# Patient Record
Sex: FEMALE | Race: WHITE | NOT HISPANIC OR LATINO | Employment: UNEMPLOYED | URBAN - METROPOLITAN AREA
[De-identification: names, ages, dates, MRNs, and addresses within clinical notes are randomized per-mention and may not be internally consistent; named-entity substitution may affect disease eponyms.]

---

## 2017-03-27 ENCOUNTER — TELEPHONE (OUTPATIENT)
Dept: MEDICAL GROUP | Facility: PHYSICIAN GROUP | Age: 57
End: 2017-03-27

## 2017-03-27 NOTE — TELEPHONE ENCOUNTER
1. Caller Name: Khanh Sandra                      Call Back Number: [phone]    2. Message: Pt  called and is very frustrated. Pt  found out today that his wife appointment is cancelled as of today for Wednesday. Khanh is worried about rx for his wife for oxycodone and I calmed him done and scheduled her to see Dr. Cardona on Thursday to get medications in the  mean time. Khanh wants his wife to see Dr. Burnette sooner than July which is the next new pt available and I dont have authority to override anything. Pt is mostly upset that no one tried more than one number listed or write a letter when his wifes appointment was scheduled in December. Pt wanted to speak with supervisor Chio Berry.  Call his work number [phone]    3. Patient approves office to leave a detailed voicemail/MyChart message: N\A

## 2017-03-28 NOTE — TELEPHONE ENCOUNTER
I called Khanh.  He was very upset that Lisa's appointment was cancelled without them knowing.  I kindly read the notes in appointment notes for the 3/30 visit.  It lists several attempts to contact Lisa starting as far back as January.   I explained several e-mails were sent as well as multiple attempts to contact Lisa.  Khanh stated they don't have an e-mail, and why wasn't the second number called?   Khanh does all of her scheduling for her.  I have updated her phone number to be his work number and also added a permanent comment to contact Khanh regarding scheduling and scheduling changes.     In regards to Lisa's visit with Dr. Cardona.  She scheduled an appointment with Dr. Burnette back in October 2016.  She was a patient of Dr. Avila.  She is coming in to see Dr. Cardona, as she is currently out of her pain medication.  She can not wait to see Dr. Burnette, as that would be months without her pain medication.   She wants to discuss with Dr. Guillen the dosage of her pain medication. It was lowered, and since it being lowered she has been in much pain.   Dr. Avila had done a referral to pain doctor, but pain doctor did nothing for Lisa, and she has never gone back.      I let Khanh know that if she is in a lot of pain, then she would need to go to UC or ER.  He said that she won't go, and will just deal with the pain.  She is almost to the point where she can not walk.

## 2017-03-30 ENCOUNTER — APPOINTMENT (OUTPATIENT)
Dept: MEDICAL GROUP | Facility: PHYSICIAN GROUP | Age: 57
End: 2017-03-30
Payer: COMMERCIAL

## 2017-03-30 ENCOUNTER — OFFICE VISIT (OUTPATIENT)
Dept: MEDICAL GROUP | Facility: PHYSICIAN GROUP | Age: 57
End: 2017-03-30
Payer: COMMERCIAL

## 2017-03-30 VITALS — HEART RATE: 106 BPM | OXYGEN SATURATION: 96 % | TEMPERATURE: 98.1 F

## 2017-03-30 DIAGNOSIS — I10 ESSENTIAL HYPERTENSION: ICD-10-CM

## 2017-03-30 DIAGNOSIS — I48.0 PAROXYSMAL ATRIAL FIBRILLATION (HCC): ICD-10-CM

## 2017-03-30 DIAGNOSIS — G89.4 CHRONIC PAIN SYNDROME: ICD-10-CM

## 2017-03-30 DIAGNOSIS — F11.90 CHRONIC, CONTINUOUS USE OF OPIOIDS: ICD-10-CM

## 2017-03-30 DIAGNOSIS — E66.01 MORBID OBESITY, UNSPECIFIED OBESITY TYPE (HCC): ICD-10-CM

## 2017-03-30 DIAGNOSIS — R80.9 MICROALBUMINURIA: ICD-10-CM

## 2017-03-30 PROCEDURE — 99214 OFFICE O/P EST MOD 30 MIN: CPT | Performed by: INTERNAL MEDICINE

## 2017-03-30 RX ORDER — ATENOLOL 25 MG/1
100 TABLET ORAL DAILY
COMMUNITY

## 2017-03-30 RX ORDER — OXYCODONE HCL 20 MG/1
20 TABLET, FILM COATED, EXTENDED RELEASE ORAL EVERY 12 HOURS
Qty: 60 EACH | Refills: 0 | Status: SHIPPED | OUTPATIENT
Start: 2017-03-30 | End: 2017-03-31

## 2017-03-30 NOTE — MR AVS SNAPSHOT
Lisa Sandra   3/30/2017 8:00 AM   Office Visit   MRN: [id number]    Department:  [facility]   Dept Phone:  [phone]    Description:  Female : 1960   Provider:  Lynda Cardona M.D.           Reason for Visit     Medication Refill pain med management      Allergies as of 3/30/2017     Allergen Noted Reactions    Nkda [No Known Drug Allergy] 10/04/2012         You were diagnosed with     Chronic pain syndrome   [338.4.ICD-9-CM]       Chronic, continuous use of opioids   [824133]       Paroxysmal atrial fibrillation (CMS-HCC)   [072873]       Essential hypertension   [9356069]       Microalbuminuria   [304015]         Vital Signs     Pulse Temperature Oxygen Saturation Smoking Status          106 36.7 °C (98.1 °F) 96% Never Smoker         Basic Information     Date Of Birth Sex Race Ethnicity Preferred Language    1960 Female White Non- English      Your appointments     May 16, 2017  3:40 PM   NEW TO YOU with Jaye Burnette M.D.   [facility] (--)    [address]  [address]  [facility] [address]   [phone]              Problem List              ICD-10-CM Priority Class Noted - Resolved    Chronic pain syndrome G89.4   2010 - Present    Morbid obesity (CMS-HCC) E66.01   2010 - Present    Chronic tension headaches G44.229   2010 - Present    IBS (irritable bowel syndrome) K58.9   2010 - Present    Hypothyroidism E03.9   2010 - Present    HTN (hypertension) I10   2010 - Present    Fibromyalgia M79.7   2010 - Present    Microalbuminuria R80.9   2013 - Present    Chronic allergic rhinitis J30.9   2013 - Present    Vitamin D deficiency E55.9   2015 - Present    Chronic, continuous use of opioids F11.90   2016 - Present    Paroxysmal atrial fibrillation (CMS-HCC) I48.0   2016 - Present      Health Maintenance        Date Due Completion Dates    IMM DTaP/Tdap/Td Vaccine (1 - Tdap) 11/10/1977 ---    COLONOSCOPY 11/10/2008 ---    MAMMOGRAM 10/14/2010 10/14/2009, 10/14/2009    IMM INFLUENZA (1) 9/1/2016 ---    PAP SMEAR 10/18/2019 10/18/2016, 7/20/2016, 2/23/2016            Current Immunizations     No immunizations on file.      Below and/or attached are the medications your provider expects you to take. Review all of your home medications and newly ordered medications with your provider and/or pharmacist. Follow medication instructions as directed by your provider and/or pharmacist. Please keep your medication list with you and share with your provider. Update the information when medications are discontinued, doses are changed, or new medications (including over-the-counter products) are added; and carry medication information at all times in the event of emergency situations     Allergies:  NKDA - (reactions not documented)               Medications  Valid as of: March 30, 2017 -  8:53 AM    Generic Name Brand Name Tablet Size Instructions for use    Ascorbic Acid (Tab) ascorbic acid 500 MG Take 500 mg by mouth every day.        Atenolol (Tab) TENORMIN 25 MG Take 25 mg by mouth every day.        B Complex-C   Take  by mouth every day. Pt takes 2 tabs once daily        Calcium Carbonate Antacid (Chew Tab) TUMS 500 MG Take 2 Tabs by mouth every day.        Cholecalciferol (Tab) Vitamin D-3 5000 UNITS Take  by mouth every day.        Coenzyme Q10 (Cap) coenzyme Q-10 30 MG Take 60 mg by mouth every day.          Dabigatran Etexilate Mesylate (Cap) PRADAXA 150 MG Take 150 mg by mouth 2 Times a Day.        Iodine (Kelp) (Tab) Kelp  Take  by mouth 1 time daily as needed.        Levothyroxine Sodium (Tab) SYNTHROID 75 MCG TAKE ONE TABLET BY MOUTH ONE TIME DAILY        Lisinopril (Tab) PRINIVIL 2.5 MG TAKE ONE TABLET BY MOUTH ONE TIME DAILY        Omega-3 Fatty Acids (Cap) Omega-3 Fatty Acids 1200 MG Take 1,200 mg by mouth 2 Times a Day.        OxyCODONE HCl (Tablet Extended Release 12 hour Abuse-Deterrent) OXYCONTIN  40 MG Take one tab in AM. Take 2 tabs in PM        OxyCODONE HCl (Tablet Extended Release 12 hour Abuse-Deterrent) OXYCONTIN 40 MG Take 1 Tab by mouth every 12 hours.        OxyCODONE HCl (Tablet Extended Release 12 hour Abuse-Deterrent) OXYCONTIN 20 MG Take 1 Tab by mouth every 12 hours.        Psyllium   Take  by mouth every day.        Resveratrol (Cap) Resveratrol 250 MG Take 2 Caps by mouth every day.        Triprolidine-Pseudoephedrine (Tab) APRODINE 2.5-60 MG Take 1 Tab by mouth every 6 hours as needed.        .                 Medicines prescribed today were sent to:     [facility] - [place], [place] - [address]    [address] [place] [address]    Phone: [phone] Fax: [phone]    Open 24 Hours?: No      Medication refill instructions:       If your prescription bottle indicates you have medication refills left, it is not necessary to call your provider’s office. Please contact your pharmacy and they will refill your medication.    If your prescription bottle indicates you do not have any refills left, you may request refills at any time through one of the following ways: The online Zaldiva system (except Urgent Care), by calling your provider’s office, or by asking your pharmacy to contact your provider’s office with a refill request. Medication refills are processed only during regular business hours and may not be available until the next business day. Your provider may request additional information or to have a follow-up visit with you prior to refilling your medication.   *Please Note: Medication refills are assigned a new Rx number when refilled electronically. Your pharmacy may indicate that no refills were authorized even though a new prescription for the same medication is available at the pharmacy. Please request the medicine by name with the pharmacy before contacting your provider for a refill.           Zaldiva Access Code: [email or web address]  Expires: 4/29/2017  8:53  AM    [employer]  A secure, online tool to manage your health information     [employer]’s Vedicis® is a secure, online tool that connects you to your personalized health information from the privacy of your home -- day or night - making it very easy for you to manage your healthcare. Once the activation process is completed, you can even access your medical information using the Vedicis salomón, which is available for free in the Apple Salomón store or Google Play store.     Vedicis provides the following levels of access (as shown below):   My Chart Features   [facility] Primary [facility] Doctor [facility]  Specialists [facility]  Urgent  Care Non-[facility]  Primary Care  Doctor   Email your healthcare team securely and privately 24/7 X X X    Manage appointments: schedule your next appointment; view details of past/upcoming appointments X      Request prescription refills. X      View recent personal medical records, including lab and immunizations X X X X   View health record, including health history, allergies, medications X X X X   Read reports about your outpatient visits, procedures, consult and ER notes X X X X   See your discharge summary, which is a recap of your hospital and/or ER visit that includes your diagnosis, lab results, and care plan. X X       How to register for Vedicis:  1. Go to  [email or web address].  2. Click on the Sign Up Now box, which takes you to the New Member Sign Up page. You will need to provide the following information:  a. Enter your Vedicis Access Code exactly as it appears at the top of this page. (You will not need to use this code after you’ve completed the sign-up process. If you do not sign up before the expiration date, you must request a new code.)   b. Enter your date of birth.   c. Enter your home email address.   d. Click Submit, and follow the next screen’s instructions.  3. Create a Vedicis ID. This will be your Vedicis login ID and cannot be changed, so think of one that is secure and  easy to remember.  4. Create a [employer] password. You can change your password at any time.  5. Enter your Password Reset Question and Answer. This can be used at a later time if you forget your password.   6. Enter your e-mail address. This allows you to receive e-mail notifications when new information is available in [employer].  7. Click Sign Up. You can now view your health information.    For assistance activating your [employer] account, call [phone]

## 2017-03-31 ENCOUNTER — TELEPHONE (OUTPATIENT)
Dept: MEDICAL GROUP | Facility: CLINIC | Age: 57
End: 2017-03-31

## 2017-03-31 DIAGNOSIS — G89.4 CHRONIC PAIN SYNDROME: ICD-10-CM

## 2017-03-31 RX ORDER — OXYCODONE HCL 40 MG/1
TABLET, FILM COATED, EXTENDED RELEASE ORAL
Qty: 60 TAB | Refills: 0 | Status: SHIPPED | OUTPATIENT
Start: 2017-03-31 | End: 2017-03-31 | Stop reason: SDUPTHER

## 2017-03-31 RX ORDER — OXYCODONE HCL 40 MG/1
TABLET, FILM COATED, EXTENDED RELEASE ORAL
Qty: 60 TAB | Refills: 0 | Status: SHIPPED | OUTPATIENT
Start: 2017-03-31

## 2017-03-31 NOTE — ASSESSMENT & PLAN NOTE
Pt is here today for pain management.. She is morbid obese. She tells me that she has pain from head to toe. Her back hurts, her feet hurts. She used to be on oxycontin 80 mg BID. Was able to cut to 40 mg BID and last one to 20 mg BID. She has seen a pain specialist and was offered injections, but she is not interested on injections . She has no imagines on file.   She is not willing to go to 20 mg BID because it is not working. One month supply given. I explained to pt that I have limitation to only 60 mg per month. Her symptoms are mostly from fibromyalgia and chronic lymphedema.    Overall impression that this patient is benefiting from opioid therapy and that the benefits outweigh the risks of continued use: yes     - Controlled Substance Use Agreement with pain specialist    - I did not order Urine drug screen today. Positive in the past. Pt compliant    - Additional lab: CMP to assess liver and renal function up to date   - Rx refill prescriptions only for one month   - Referral to pain management: yes

## 2017-03-31 NOTE — TELEPHONE ENCOUNTER
Khanh Sandra, Lisa's , will come in today to pickup new RX for Oxy 40MG, 2 RX's.  I have notified Khanh about this pick-up.

## 2017-03-31 NOTE — PROGRESS NOTES
Subjective:   Lisa Sandra is a 56 y.o. female here today for pain meds     Chronic pain syndrome  Pt is here today for pain management.. She is morbid obese. She tells me that she has pain from head to toe. Her back hurts, her feet hurts. She used to be on oxycontin 80 mg BID. Was able to cut to 40 mg BID and last one to 20 mg BID. She has seen a pain specialist and was offered injections, but she is not interested on injections . She has no imagines on file.   She is not willing to go to 20 mg BID because it is not working. One month supply given. I explained to pt that I have limitation to only 60 mg per month. Her symptoms are mostly from fibromyalgia and chronic lymphedema.    Overall impression that this patient is benefiting from opioid therapy and that the benefits outweigh the risks of continued use: yes     - Controlled Substance Use Agreement with pain specialist    - I did not order Urine drug screen today. Positive in the past. Pt compliant    - Additional lab: CMP to assess liver and renal function up to date   - Rx refill prescriptions only for one month   - Referral to pain management: yes      Paroxysmal atrial fibrillation  Rate controled. Asymptomatic. On atenolol and pradaxa. No echo on file. Follow up with cardiologist    Morbid Obesity  She refuses to weight. Her HR goes extremely high is she weight. She tells me that she starve herself. She eats only strawberry and small portion. She cannot loose weight. She tells that she was on diet since she was 5 y/o. She is not interested on gastric by pass because she does not eat much. She states that she is very malnourished because she eats little, that's why she is on vitamin supplement     Microalbuminuria  She is on lisinopril 2.5 mg daily. No diabetes, likely from obesity     HTN (Hypertension)  Well controled. On minimal dose of lisinopril and atenolol          Current medicines (including changes today)  Current Outpatient Prescriptions    Medication Sig Dispense Refill   • atenolol (TENORMIN) 25 MG Tab Take 25 mg by mouth every day.     • oxyCODONE CR (OXYCONTIN) 20 MG Tablet Extended Release 12 hour Abuse-Deterrent Take 1 Tab by mouth every 12 hours. 60 Each 0   • oxyCODONE CR (OXYCONTIN) 40 MG Tablet Extended Release 12 hour Abuse-Deterrent tablet Take 1 Tab by mouth every 12 hours. 60 Each 0   • levothyroxine (SYNTHROID) 75 MCG Tab TAKE ONE TABLET BY MOUTH ONE TIME DAILY 90 Tab 3   • triprolidine-PSE (APRODINE) 2.5-60 MG Tab Take 1 Tab by mouth every 6 hours as needed. 360 Tab 1   • lisinopril (PRINIVIL) 2.5 MG Tab TAKE ONE TABLET BY MOUTH ONE TIME DAILY 90 Tab 2   • dabigatran (PRADAXA) 150 MG Cap capsule Take 150 mg by mouth 2 Times a Day.     • Cholecalciferol (VITAMIN D-3) 5000 UNITS Tab Take  by mouth every day.     • ascorbic acid (ASCORBIC ACID) 500 MG TABS Take 500 mg by mouth every day.     • coenzyme Q-10 30 MG capsule Take 60 mg by mouth every day.       • Kelp TABS Take  by mouth 1 time daily as needed.     • calcium carbonate (TUMS) 500 MG CHEW Take 2 Tabs by mouth every day.     • Resveratrol 250 MG CAPS Take 2 Caps by mouth every day.     • B Complex-C (SUPER B COMPLEX PO) Take  by mouth every day. Pt takes 2 tabs once daily     • Psyllium (METAMUCIL PO) Take  by mouth every day.     • Omega-3 Fatty Acids (OMEGA 3) 1200 MG CAPS Take 1,200 mg by mouth 2 Times a Day.     • oxyCODONE CR (OXYCONTIN) 40 MG Tablet Extended Release 12 hour Abuse-Deterrent tablet Take one tab in AM. Take 2 tabs in PM 90 Tab 0     No current facility-administered medications for this visit.     She  has a past medical history of Morbid obesity (CMS-HCC) (1/19/2010); Chronic tension headaches (1/19/2010); IBS (irritable bowel syndrome) (1/19/2010); Hypothyroidism (1/19/2010); HTN (hypertension) (1/19/2010); Uterine cancer (CMS-HCC) (2009); Fibromyalgia (12/28/2010); Atrial fibrillation (CMS-[facility]); Hypercalcemia; and Allergic rhinitis (9/9/2011).    Current  Outpatient Prescriptions   Medication Sig Dispense Refill   • atenolol (TENORMIN) 25 MG Tab Take 25 mg by mouth every day.     • oxyCODONE CR (OXYCONTIN) 20 MG Tablet Extended Release 12 hour Abuse-Deterrent Take 1 Tab by mouth every 12 hours. 60 Each 0   • oxyCODONE CR (OXYCONTIN) 40 MG Tablet Extended Release 12 hour Abuse-Deterrent tablet Take 1 Tab by mouth every 12 hours. 60 Each 0   • levothyroxine (SYNTHROID) 75 MCG Tab TAKE ONE TABLET BY MOUTH ONE TIME DAILY 90 Tab 3   • triprolidine-PSE (APRODINE) 2.5-60 MG Tab Take 1 Tab by mouth every 6 hours as needed. 360 Tab 1   • lisinopril (PRINIVIL) 2.5 MG Tab TAKE ONE TABLET BY MOUTH ONE TIME DAILY 90 Tab 2   • dabigatran (PRADAXA) 150 MG Cap capsule Take 150 mg by mouth 2 Times a Day.     • Cholecalciferol (VITAMIN D-3) 5000 UNITS Tab Take  by mouth every day.     • ascorbic acid (ASCORBIC ACID) 500 MG TABS Take 500 mg by mouth every day.     • coenzyme Q-10 30 MG capsule Take 60 mg by mouth every day.       • Kelp TABS Take  by mouth 1 time daily as needed.     • calcium carbonate (TUMS) 500 MG CHEW Take 2 Tabs by mouth every day.     • Resveratrol 250 MG CAPS Take 2 Caps by mouth every day.     • B Complex-C (SUPER B COMPLEX PO) Take  by mouth every day. Pt takes 2 tabs once daily     • Psyllium (METAMUCIL PO) Take  by mouth every day.     • Omega-3 Fatty Acids (OMEGA 3) 1200 MG CAPS Take 1,200 mg by mouth 2 Times a Day.     • oxyCODONE CR (OXYCONTIN) 40 MG Tablet Extended Release 12 hour Abuse-Deterrent tablet Take one tab in AM. Take 2 tabs in PM 90 Tab 0     No current facility-administered medications for this visit.       Allergies as of 03/30/2017 - [place] as Reviewed 03/30/2017   Allergen Reaction Noted   • Nkda [no known drug allergy]  10/04/2012       Social History     Social History   • Marital Status:      Spouse Name: N/A   • Number of Children: N/A   • Years of Education: N/A     Occupational History   • Not on file.     Social History Main  Topics   • Smoking status: Never Smoker    • Smokeless tobacco: Never Used   • Alcohol Use: No   • Drug Use: No   • Sexual Activity: Not on file     Other Topics Concern   • Not on file     Social History Narrative        Family History   Problem Relation Age of Onset   • Cancer Mother      breast cancer       Past Surgical History   Procedure Laterality Date   • Abdominal hysterectomy total     • Dilation and curettage  8-2009     D & C   • Recovery  9/28/2011     Performed by SURGERY, CATH-RECOVERY at [facility]   • Parathyroidectomy  November 2011       ROS   No chest pain, no shortness of breath, no abdominal pain. All other system negative expect for HPI        Objective:     Pulse 106, temperature 36.7 °C (98.1 °F), SpO2 96 %. There is no weight on file to calculate BMI.   Physical Exam:  Constitutional: Alert, no distress, very obese   Skin: Warm, dry, good turgor, no rashes in visible areas.  Eye: Equal, round and reactive, conjunctiva clear, lids normal.  ENMT: Lips without lesions, good dentition, oropharynx clear.  Neck: Trachea midline, no masses, no thyromegaly. No cervical or supraclavicular lymphadenopathy  Respiratory: Unlabored respiratory effort, lungs clear to auscultation, no wheezes, no ronchi.  Cardiovascular: irregular S1, S2, no murmur,  + chronic lymphedema   Abdomen: Soft, non-tender, no masses, no hepatosplenomegaly.  Psych: Alert and oriented x3, normal affect and mood.        Assessment and Plan:   The following treatment plan was discussed    1. Chronic pain syndrome  - REFERRAL TO PAIN MANAGEMENT  - oxyCODONE CR (OXYCONTIN) 20 MG Tablet Extended Release 12 hour Abuse-Deterrent; Take 1 Tab by mouth every 12 hours.  Dispense: 60 Each; Refill: 0    2. Chronic, continuous use of opioids  - REFERRAL TO PAIN MANAGEMENT    3. Paroxysmal atrial fibrillation (CMS-HCC)  Rate controled. Asymptomatic. S/p cardioversion twice     4. Essential hypertension  Well controled.  Continue to monitor     5. Microalbuminuria  Continue to monitor     6. Morbid obesity, unspecified obesity type (CMS-HCC)  Pt needs close monitoring by specialized medical health. TSH wnl.       Followup: Return if symptoms worsen or fail to improve, for Long.

## 2017-03-31 NOTE — ASSESSMENT & PLAN NOTE
She refuses to weight. Her HR goes extremely high is she weight. She tells me that she starve herself. She eats only strawberry and small portion. She cannot loose weight. She tells that she was on diet since she was 7 y/o. She is not interested on gastric by pass because she does not eat much. She states that she is very malnourished because she eats little, that's why she is on vitamin supplement

## 2017-03-31 NOTE — ASSESSMENT & PLAN NOTE
Rate controled. Asymptomatic. On atenolol and pradaxa. No echo on file. Follow up with cardiologist

## 2017-04-18 ENCOUNTER — HOSPITAL ENCOUNTER (OUTPATIENT)
Facility: MEDICAL CENTER | Age: 57
End: 2017-04-18
Attending: SPECIALIST
Payer: COMMERCIAL

## 2017-04-18 PROCEDURE — 88175 CYTOPATH C/V AUTO FLUID REDO: CPT

## 2017-04-19 LAB — CYTOLOGY REG CYTOL: NORMAL

## 2017-04-24 RX ORDER — PSEUDOEPHEDRINE HCL AND TRIPOLIDINE 60; 2.5 MG/1; MG/1
TABLET, FILM COATED ORAL
Qty: 360 TAB | Refills: 0 | Status: SHIPPED | OUTPATIENT
Start: 2017-04-24 | End: 2017-08-16 | Stop reason: SDUPTHER

## 2017-05-16 ENCOUNTER — OFFICE VISIT (OUTPATIENT)
Dept: MEDICAL GROUP | Facility: PHYSICIAN GROUP | Age: 57
End: 2017-05-16
Payer: COMMERCIAL

## 2017-05-16 VITALS
OXYGEN SATURATION: 98 % | DIASTOLIC BLOOD PRESSURE: 86 MMHG | HEART RATE: 95 BPM | WEIGHT: 293 LBS | HEIGHT: 65 IN | SYSTOLIC BLOOD PRESSURE: 146 MMHG | BODY MASS INDEX: 48.82 KG/M2 | TEMPERATURE: 97.7 F

## 2017-05-16 DIAGNOSIS — E03.9 HYPOTHYROIDISM, UNSPECIFIED TYPE: ICD-10-CM

## 2017-05-16 DIAGNOSIS — I10 ESSENTIAL HYPERTENSION: ICD-10-CM

## 2017-05-16 DIAGNOSIS — M79.7 FIBROMYALGIA: ICD-10-CM

## 2017-05-16 DIAGNOSIS — Z79.891 CHRONIC USE OF OPIATE DRUGS THERAPEUTIC PURPOSES: ICD-10-CM

## 2017-05-16 DIAGNOSIS — I48.0 PAROXYSMAL ATRIAL FIBRILLATION (HCC): ICD-10-CM

## 2017-05-16 DIAGNOSIS — G89.4 CHRONIC PAIN SYNDROME: ICD-10-CM

## 2017-05-16 DIAGNOSIS — F11.20 OPIATE DEPENDENCE, CONTINUOUS (HCC): ICD-10-CM

## 2017-05-16 PROCEDURE — 99214 OFFICE O/P EST MOD 30 MIN: CPT | Performed by: FAMILY MEDICINE

## 2017-05-16 NOTE — Clinical Note
May 18, 2017        Lisa Sandra  Current Outpatient Prescriptions   Medication Sig Dispense Refill   • APRODINE 2.5-60 MG Tab TAKE ONE TABLET BY MOUTH EVERY SIX HOURS AS NEEDED 360 Tab 0   • oxyCODONE CR (OXYCONTIN) 40 MG Tablet Extended Release 12 hour Abuse-Deterrent tablet Take one tab in AM. Take 2 tabs in PM 60 Tab 0   • atenolol (TENORMIN) 25 MG Tab Take 25 mg by mouth every day.     • oxyCODONE CR (OXYCONTIN) 40 MG Tablet Extended Release 12 hour Abuse-Deterrent tablet Take 1 Tab by mouth every 12 hours. 60 Each 0   • levothyroxine (SYNTHROID) 75 MCG Tab TAKE ONE TABLET BY MOUTH ONE TIME DAILY 90 Tab 3   • lisinopril (PRINIVIL) 2.5 MG Tab TAKE ONE TABLET BY MOUTH ONE TIME DAILY 90 Tab 2   • dabigatran (PRADAXA) 150 MG Cap capsule Take 150 mg by mouth 2 Times a Day.     • Cholecalciferol (VITAMIN D-3) 5000 UNITS Tab Take  by mouth every day.     • ascorbic acid (ASCORBIC ACID) 500 MG TABS Take 500 mg by mouth every day.     • coenzyme Q-10 30 MG capsule Take 60 mg by mouth every day.       • Kelp TABS Take  by mouth 1 time daily as needed.     • calcium carbonate (TUMS) 500 MG CHEW Take 2 Tabs by mouth every day.     • Resveratrol 250 MG CAPS Take 2 Caps by mouth every day.     • B Complex-C (SUPER B COMPLEX PO) Take  by mouth every day. Pt takes 2 tabs once daily     • Psyllium (METAMUCIL PO) Take  by mouth every day.     • Omega-3 Fatty Acids (OMEGA 3) 1200 MG CAPS Take 1,200 mg by mouth 2 Times a Day.       No current facility-administered medications for this visit.

## 2017-05-16 NOTE — MR AVS SNAPSHOT
"        Lisa Sandra   2017 3:40 PM   Office Visit   MRN: [id number]    Department:  [facility]   Dept Phone:  [phone]    Description:  Female : 1960   Provider:  Jaye Burnette M.D.           Reason for Visit     Establish Care new to you       Allergies as of 2017     Allergen Noted Reactions    Nkda [No Known Drug Allergy] 10/04/2012         You were diagnosed with     Chronic pain syndrome   [338.4.ICD-9-CM]       Chronic use of opiate drugs therapeutic purposes   [8626507]       Opiate dependence, continuous (CMS-[facility])   [684299]       Fibromyalgia   [952460]       Hypothyroidism, unspecified type   [3725718]       Essential hypertension   [2844683]       Paroxysmal atrial fibrillation (CMS-[facility])   [029151]         Vital Signs     Blood Pressure Pulse Temperature Height Weight Body Mass Index    146/86 mmHg 95 36.5 °C (97.7 °F) 1.651 m (5' 5\") 153.316 kg (338 lb) 56.25 kg/m2    Oxygen Saturation Smoking Status                98% Never Smoker           Basic Information     Date Of Birth Sex Race Ethnicity Preferred Language    1960 Female White Non- English      Problem List              ICD-10-CM Priority Class Noted - Resolved    Chronic pain syndrome G89.4   2010 - Present    Morbid obesity (CMS-HCC) E66.01   2010 - Present    Chronic tension headaches G44.229   2010 - Present    IBS (irritable bowel syndrome) K58.9   2010 - Present    Hypothyroidism E03.9   2010 - Present    HTN (hypertension) I10   2010 - Present    Fibromyalgia M79.7   2010 - Present    Microalbuminuria R80.9   2013 - Present    Chronic allergic rhinitis J30.9   2013 - Present    Vitamin D deficiency E55.9   2015 - Present    Chronic, continuous use of opioids F11.90   2016 - Present    Paroxysmal atrial fibrillation (CMS-[facility]) I48.0   2016 - Present      Health Maintenance        Date Due Completion Dates    IMM DTaP/Tdap/Td Vaccine (1 " - Tdap) 11/10/1977 ---    COLONOSCOPY 11/10/2008 ---    MAMMOGRAM 10/14/2010 10/14/2009, 10/14/2009    PAP SMEAR 4/18/2020 4/18/2017, 10/18/2016, 7/20/2016, 2/23/2016            Current Immunizations     No immunizations on file.      Below and/or attached are the medications your provider expects you to take. Review all of your home medications and newly ordered medications with your provider and/or pharmacist. Follow medication instructions as directed by your provider and/or pharmacist. Please keep your medication list with you and share with your provider. Update the information when medications are discontinued, doses are changed, or new medications (including over-the-counter products) are added; and carry medication information at all times in the event of emergency situations     Allergies:  NKDA - (reactions not documented)               Medications  Valid as of: May 16, 2017 -  5:47 PM    Generic Name Brand Name Tablet Size Instructions for use    Ascorbic Acid (Tab) ascorbic acid 500 MG Take 500 mg by mouth every day.        Atenolol (Tab) TENORMIN 25 MG Take 25 mg by mouth every day.        B Complex-C   Take  by mouth every day. Pt takes 2 tabs once daily        Calcium Carbonate Antacid (Chew Tab) TUMS 500 MG Take 2 Tabs by mouth every day.        Cholecalciferol (Tab) Vitamin D-3 5000 UNITS Take  by mouth every day.        Coenzyme Q10 (Cap) coenzyme Q-10 30 MG Take 60 mg by mouth every day.          Dabigatran Etexilate Mesylate (Cap) PRADAXA 150 MG Take 150 mg by mouth 2 Times a Day.        Iodine (Kelp) (Tab) Kelp  Take  by mouth 1 time daily as needed.        Levothyroxine Sodium (Tab) SYNTHROID 75 MCG TAKE ONE TABLET BY MOUTH ONE TIME DAILY        Lisinopril (Tab) PRINIVIL 2.5 MG TAKE ONE TABLET BY MOUTH ONE TIME DAILY        Omega-3 Fatty Acids (Cap) Omega-3 Fatty Acids 1200 MG Take 1,200 mg by mouth 2 Times a Day.        OxyCODONE HCl (Tablet Extended Release 12 hour Abuse-Deterrent) OXYCONTIN  40 MG Take 1 Tab by mouth every 12 hours.        OxyCODONE HCl (Tablet Extended Release 12 hour Abuse-Deterrent) OXYCONTIN 40 MG Take one tab in AM. Take 2 tabs in PM        Psyllium   Take  by mouth every day.        Resveratrol (Cap) Resveratrol 250 MG Take 2 Caps by mouth every day.        Triprolidine-Pseudoephedrine (Tab) APRODINE 2.5-60 MG TAKE ONE TABLET BY MOUTH EVERY SIX HOURS AS NEEDED        .                 Medicines prescribed today were sent to:     [facility] [place] [place] [facility]    [address] [facility] [facility] [address]    Phone: [phone] Fax: [phone]    Open 24 Hours?: No      Medication refill instructions:       If your prescription bottle indicates you have medication refills left, it is not necessary to call your provider’s office. Please contact your pharmacy and they will refill your medication.    If your prescription bottle indicates you do not have any refills left, you may request refills at any time through one of the following ways: The online [employer] system (except Urgent Care), by calling your provider’s office, or by asking your pharmacy to contact your provider’s office with a refill request. Medication refills are processed only during regular business hours and may not be available until the next business day. Your provider may request additional information or to have a follow-up visit with you prior to refilling your medication.   *Please Note: Medication refills are assigned a new Rx number when refilled electronically. Your pharmacy may indicate that no refills were authorized even though a new prescription for the same medication is available at the pharmacy. Please request the medicine by name with the pharmacy before contacting your provider for a refill.        Your To Do List     Future Labs/Procedures Complete By Expires    CBC WITH DIFFERENTIAL  As directed 5/17/2018    COMP METABOLIC PANEL  As directed 5/17/2018    LIPID PROFILE  As directed  5/17/2018    TSH  As directed 5/17/2018      Referral     A referral request has been sent to our patient care coordination department. Please allow 3-5 business days for us to process this request and contact you either by phone or mail. If you do not hear from us by the 5th business day, please call us at [phone].           [employer] Access Code: [email or web address]  Expires: 5/29/2017 11:01 AM    [employer]  A secure, online tool to manage your health information     [employer]’s [employer]® is a secure, online tool that connects you to your personalized health information from the privacy of your home -- day or night - making it very easy for you to manage your healthcare. Once the activation process is completed, you can even access your medical information using the [employer] salomón, which is available for free in the [employer] store or Google Play store.     [employer] provides the following levels of access (as shown below):   My Chart Features   [facility] Primary Care Doctor [facility]  Specialists [facility]  Urgent  Care Non-[facility]  Primary Care  Doctor   Email your healthcare team securely and privately 24/7 X X X    Manage appointments: schedule your next appointment; view details of past/upcoming appointments X      Request prescription refills. X      View recent personal medical records, including lab and immunizations X X X X   View health record, including health history, allergies, medications X X X X   Read reports about your outpatient visits, procedures, consult and ER notes X X X X   See your discharge summary, which is a recap of your hospital and/or ER visit that includes your diagnosis, lab results, and care plan. X X       How to register for [employer]:  1. Go to  [email or web address].  2. Click on the Sign Up Now box, which takes you to the New Member Sign Up page. You will need to provide the following information:  a. Enter your [employer] Access Code exactly as it appears at the top of this page. (You  will not need to use this code after you’ve completed the sign-up process. If you do not sign up before the expiration date, you must request a new code.)   b. Enter your date of birth.   c. Enter your home email address.   d. Click Submit, and follow the next screen’s instructions.  3. Create a [employer] ID. This will be your [employer] login ID and cannot be changed, so think of one that is secure and easy to remember.  4. Create a [employer] password. You can change your password at any time.  5. Enter your Password Reset Question and Answer. This can be used at a later time if you forget your password.   6. Enter your e-mail address. This allows you to receive e-mail notifications when new information is available in [employer].  7. Click Sign Up. You can now view your health information.    For assistance activating your [employer] account, call [phone]

## 2017-05-16 NOTE — Clinical Note
[employer] [facility]  Jaye Burnette M.D.  [address]  [place] [address]  Fax: [phone]   Authorization for Release/Disclosure of   Protected Health Information   Name: MARIFER RUIZ : 1960 SSN: [id number]   Address: [address] [place] [address]  [place] [address] Phone:    [phone] (home) [phone] (work)   I authorize the entity listed below to release/disclose the PHI below to:   [facility]/Jaye Burnette M.D. and Jaye Burnette M.D.   Provider or Entity Name:     Address   City, State, Zip   Phone:      Fax:     Reason for request: continuity of care   Information to be released:    [  ] LAST COLONOSCOPY,  including any PATH REPORT and follow-up  [  ] LAST FIT/COLOGUARD RESULT [  ] LAST DEXA  [  ] LAST MAMMOGRAM  [  ] LAST PAP  [  ] LAST LABS [  ] RETINA EXAM REPORT  [  ] IMMUNIZATION RECORDS  [  ] Release all info      [  ] Check here and initial the line next to each item to release ALL health information INCLUDING  _____ Care and treatment for drug and / or alcohol abuse  _____ HIV testing, infection status, or AIDS  _____ Genetic Testing    DATES OF SERVICE OR TIME PERIOD TO BE DISCLOSED: _____________  I understand and acknowledge that:  * This Authorization may be revoked at any time by you in writing, except if your health information has already been used or disclosed.  * Your health information that will be used or disclosed as a result of you signing this authorization could be re-disclosed by the recipient. If this occurs, your re-disclosed health information may no longer be protected by State or Federal laws.  * You may refuse to sign this Authorization. Your refusal will not affect your ability to obtain treatment.  * This Authorization becomes effective upon signing and will  on (date) __________.      If no date is indicated, this Authorization will  one (1) year from the signature date.    Name: Marifer Ruiz    Signature:   Date:     2017       PLEASE FAX  REQUESTED RECORDS BACK TO: [phone]

## 2017-05-16 NOTE — Clinical Note
[facility]  Jaye Burnette M.D.  [address]  [place] [address]  Fax: [phone]   Authorization for Release/Disclosure of   Protected Health Information   Name: MARIFER RUIZ : 1960 SSN: [id number]   Address: [address] [place] [address]  [place] [address] Phone:    [phone] (home) [phone] (work)   I authorize the entity listed below to release/disclose the PHI below to:   [facility]/Jaye Burnette M.D. and Jaye Burnette M.D.   Provider or Entity Name:    [facility]   Address   City, State, Zip   Phone:      Fax:     Reason for request: continuity of care   Information to be released:    [ x ] LAST COLONOSCOPY,  including any PATH REPORT and follow-up  [  ] LAST FIT/COLOGUARD RESULT [  ] LAST DEXA  [  ] LAST MAMMOGRAM  [  ] LAST PAP  [  ] LAST LABS [  ] RETINA EXAM REPORT  [  ] IMMUNIZATION RECORDS  [  ] Release all info      [  ] Check here and initial the line next to each item to release ALL health information INCLUDING  _____ Care and treatment for drug and / or alcohol abuse  _____ HIV testing, infection status, or AIDS  _____ Genetic Testing    DATES OF SERVICE OR TIME PERIOD TO BE DISCLOSED: _____________  I understand and acknowledge that:  * This Authorization may be revoked at any time by you in writing, except if your health information has already been used or disclosed.  * Your health information that will be used or disclosed as a result of you signing this authorization could be re-disclosed by the recipient. If this occurs, your re-disclosed health information may no longer be protected by State or Federal laws.  * You may refuse to sign this Authorization. Your refusal will not affect your ability to obtain treatment.  * This Authorization becomes effective upon signing and will  on (date) __________.      If no date is indicated, this Authorization will  one (1) year from the signature date.    Name: Marifer Ruiz    Signature:   Date:          5/16/2017       PLEASE FAX REQUESTED RECORDS BACK TO: [phone]

## 2017-05-18 ENCOUNTER — TELEPHONE (OUTPATIENT)
Dept: MEDICAL GROUP | Facility: PHYSICIAN GROUP | Age: 57
End: 2017-05-18

## 2017-05-18 NOTE — PROGRESS NOTES
Subjective:      Lisa Sandra is a 56 y.o. female who presents with Establish Care            HPI     This is a 56-year-old white female who was previously under the care of Dr. Avila, she is here to get established with me.    She has chronic pain syndrome due to bilateral lower extremity pain. Patient claims that 10 years ago she lost skin of her legs. She said she was under the care of the dermatologist for this problem. She couldn't specify what type of problem it was. She has ongoing pain as a result of the illness. Another source of her pain according to her is the fibromyalgia. She said 3 doctors have diagnosed her with this problem. She said she hurts all over her body. When asked if there is any part of the body that does not hurt she said there is no part in her body that is not hurting. She also has chronic tension headaches which is also the source of her chronic pain. Late last year she was referred by Dr. Avila to pain specialist Dr. Avednaño and was not happy with the care provided to her. I have asked the patient if she has tried any medication for fibromyalgia in the past and she said she is not going to take any medications other than what she is taking currently because of side effects. I asked again if she has tried them and she said no. She said she will only take whatever works and since her pain medication is working she does not want to take any other medication. She was on OxyContin 80 mg twice a day for at least 7 years. In December 2016 Dr. Avila started tapering her down the dose to 80 mg in the morning and 40 mg at night for one month, 40 mg twice a day for one month and then 20 mg twice a day for one month. When she went down to 20 mg twice a day she was having increasing pain so she went to see Dr. Cardona. Her dose was increased back to 40 mg twice a day. Last controlled substance treatment agreement was signed on 1/26/15. Last urine drug screen was done in March 2016  which came back consistent with medications prescribed without any illicit drugs.    She has hypothyroidism for which she takes thyroid replacement. Last TSH was in 9/69 came back adequately replaced.    She has hypertension for which she takes atenolol and lisinopril with good control of her blood pressure. Today her blood pressure is slightly elevated than her previous visits.    She also has paroxysmal atrial fibrillation. Per patient she had cardioversion ×2 which worked initially but then she went back to [facility]. She is on anticoagulation which is pradaxa. She said it is the reason she can't take any aspirin or NSAIDs for her pain. She is followed by her cardiologist Dr. Jane with [facility].    She has history of uterine cancer status post hysterectomy in 2009. Is followed by GYN on call ologist Dr. Babin regularly and closely for surveillance.    She is also morbidly obese with BMI of 56.    I reviewed the following    Past Medical History   Diagnosis Date   • Morbid obesity (CMS-HCC) 1/19/2010   • Chronic tension headaches 1/19/2010   • IBS (irritable bowel syndrome) 1/19/2010   • Hypothyroidism 1/19/2010   • HTN (hypertension) 1/19/2010   • Uterine cancer (CMS-[facility]) 2009     status post hysterectomy   • Fibromyalgia 12/28/2010   • Atrial fibrillation (CMS-HCC)    • Hypercalcemia    • Allergic rhinitis 9/9/2011        Past Surgical History   Procedure Laterality Date   • Abdominal hysterectomy total     • Dilation and curettage  8-2009     D & C   • Recovery  9/28/2011     Performed by SURGERY, CATH-RECOVERY at [facility]   • Parathyroidectomy  November 2011       Allergies   Allergen Reactions   • Nkda [No Known Drug Allergy]        Current Outpatient Prescriptions   Medication Sig Dispense Refill   • APRODINE 2.5-60 MG Tab TAKE ONE TABLET BY MOUTH EVERY SIX HOURS AS NEEDED 360 Tab 0   • oxyCODONE CR (OXYCONTIN) 40 MG Tablet Extended Release 12 hour  Abuse-Deterrent tablet Take one tab in AM. Take 2 tabs in PM 60 Tab 0   • atenolol (TENORMIN) 25 MG Tab Take 25 mg by mouth every day.     • oxyCODONE CR (OXYCONTIN) 40 MG Tablet Extended Release 12 hour Abuse-Deterrent tablet Take 1 Tab by mouth every 12 hours. 60 Each 0   • levothyroxine (SYNTHROID) 75 MCG Tab TAKE ONE TABLET BY MOUTH ONE TIME DAILY 90 Tab 3   • lisinopril (PRINIVIL) 2.5 MG Tab TAKE ONE TABLET BY MOUTH ONE TIME DAILY 90 Tab 2   • dabigatran (PRADAXA) 150 MG Cap capsule Take 150 mg by mouth 2 Times a Day.     • Cholecalciferol (VITAMIN D-3) 5000 UNITS Tab Take  by mouth every day.     • ascorbic acid (ASCORBIC ACID) 500 MG TABS Take 500 mg by mouth every day.     • coenzyme Q-10 30 MG capsule Take 60 mg by mouth every day.       • Kelp TABS Take  by mouth 1 time daily as needed.     • calcium carbonate (TUMS) 500 MG CHEW Take 2 Tabs by mouth every day.     • Resveratrol 250 MG CAPS Take 2 Caps by mouth every day.     • B Complex-C (SUPER B COMPLEX PO) Take  by mouth every day. Pt takes 2 tabs once daily     • Psyllium (METAMUCIL PO) Take  by mouth every day.     • Omega-3 Fatty Acids (OMEGA 3) 1200 MG CAPS Take 1,200 mg by mouth 2 Times a Day.       No current facility-administered medications for this visit.        Family History   Problem Relation Age of Onset   • Cancer Mother      breast cancer       Social History     Social History   • Marital Status:      Spouse Name: N/A   • Number of Children: N/A   • Years of Education: N/A     Occupational History   • Not on file.     Social History Main Topics   • Smoking status: Never Smoker    • Smokeless tobacco: Never Used   • Alcohol Use: No   • Drug Use: No   • Sexual Activity: Not on file     Other Topics Concern   • Not on file     Social History Narrative        ROS     Review of systems as per history of present illness, the rest are negative.       Objective:     /86 mmHg  Pulse 95  Temp(Src) 36.5 °C (97.7 °F)  Ht 1.651 m (5'  "5\")  Wt 153.316 kg (338 lb)  BMI 56.25 kg/m2  SpO2 98%     Physical Exam     Examined alert, awake, oriented, not in distress, morbidly obese    Neck-supple, no lymphadenopathy, no thyromegaly  Lungs-clear to auscultation, no rales, no wheezes  Heart-irregular irregular rhythm consistent with A. fib, normal rate, no murmur appreciated  Extremities-she has hyperpigmentation/discoloration of the feet and legs consistent with venostasis, no pitting edema, no clubbing, no cyanosis, strong pedal pulses bilaterally, no calf tenderness          Assessment/Plan:     1. Chronic pain syndrome  Sources of her pain are chronic bilateral leg pain from previous skin problem which has already healed completely with ongoing residual pain, fibromyalgia, tension headaches. She is currently taking OxyContin 40 mg twice daily which is more than 90 morphine equivalents. She was already referred to the pain specialist late last year but she was not happy with the care. Discussed the need to see a pain specialist to manage her chronic pain especially because she is on high-dose of OxyContin more than 90 morphine equivalents. Referral placed to Dr. Harrison. Controlled substance agreement was signed today. We did a drug screen today using saliva as the specimen because she couldn't give us a urine sample. She refuses to take any medication for fibromyalgia because she does not want to have side effects. She states she will stay on her pain medication because this is the one that works. I told the patient we need to do follow-up blood work because she has not had CMP, CBC in over a year. Patient states that her specialists send her for blood work periodically. She also states that there are already orders from her specialist to do follow-up blood work that were put in the computer. I explained to the patient that her specialists are not using the same EPIC system that is why I don't see the orders or any results of labs from the " "specialists. Patient then told me that I don't know how to use the computer or know my way around with the computer and I should have her cardiologist teach me how to use the computer. During this visit patient talks incessantly and it is hard to even interject or interrupt her. She was using foul language such as \"Shit\". I explained to her that she should find another physician to take care of her because we are not a good fit. She is questioning my capability and my capacity to use the computer as a tool for medical care. She is also not willing to follow medical advice in terms of trying nonopiate medications to manage fibromyalgia. When I started explaining her about the need to see another physician, she started saying that she will just stop taking the pain medication herself. She asked how she would do this and if she would have any problems stopping them. I explained to her that she will need to be managed by specialist with regards to tapering her down the medication. She cannot stop this medication right away because of potential for withdrawal problems. She will most likely have complications especially because of her atrial fibrillation which may be life-threatening. The pain specialist will be able to help her get off the medication if she will decide to proceed with this. They maybe able to also give her pain medication to help her come off opiates. I told the patient I am willing to continue to give her refills until she is able to establish with another physician or until she is seen by the pain specialist who will take over her pain management, whichever comes first.   - TSH; Future  - LIPID PROFILE; Future  - COMP METABOLIC PANEL; Future  - CBC WITH DIFFERENTIAL; Future  - REFERRAL TO PAIN CLINIC  - Controlled Substance Treatment Agreement    2. Chronic use of opiate drugs therapeutic purposes  Same as #1.  - Controlled Substance Treatment Agreement    3. Opiate dependence, continuous " (CMS-HCC)  Same as #1.  - Controlled Substance Treatment Agreement    4. Fibromyalgia  Same as #1.  - TSH; Future  - LIPID PROFILE; Future  - COMP METABOLIC PANEL; Future  - CBC WITH DIFFERENTIAL; Future  - REFERRAL TO PAIN CLINIC    5. Hypothyroidism, unspecified type  I ordered follow-up TSH since it has been over 6 months since the last one.  - TSH; Future  - LIPID PROFILE; Future  - COMP METABOLIC PANEL; Future  - CBC WITH DIFFERENTIAL; Future    6. Essential hypertension  Slightly elevated blood pressure today but previously running good. Continue current medications.  - TSH; Future  - LIPID PROFILE; Future  - COMP METABOLIC PANEL; Future  - CBC WITH DIFFERENTIAL; Future    7. Paroxysmal atrial fibrillation (CMS-HCC)  She is in atrial fibrillation but the rate is controlled. Continue follow-up with cardiologist.  - TSH; Future  - LIPID PROFILE; Future  - COMP METABOLIC PANEL; Future  - CBC WITH DIFFERENTIAL; Future        Please note that this dictation was created using voice recognition software. I have worked with consultants from the vendor as well as technical experts from [facility]  [employer] to optimize the interface. I have made every reasonable attempt to correct obvious errors, but I expect that there are errors of grammar and possibly content I did not discover before finalizing the note.

## 2017-05-26 ENCOUNTER — TELEPHONE (OUTPATIENT)
Dept: MEDICAL GROUP | Facility: PHYSICIAN GROUP | Age: 57
End: 2017-05-26

## 2017-05-30 ENCOUNTER — TELEPHONE (OUTPATIENT)
Dept: MEDICAL GROUP | Facility: PHYSICIAN GROUP | Age: 57
End: 2017-05-30

## 2017-05-30 DIAGNOSIS — M79.7 FIBROMYALGIA: ICD-10-CM

## 2017-05-30 DIAGNOSIS — G89.4 CHRONIC PAIN SYNDROME: ICD-10-CM

## 2017-05-30 RX ORDER — OXYCODONE HCL 40 MG/1
40 TABLET, FILM COATED, EXTENDED RELEASE ORAL EVERY 12 HOURS
Qty: 60 EACH | Refills: 0 | Status: SHIPPED | OUTPATIENT
Start: 2017-05-30 | End: 2017-06-27 | Stop reason: SDUPTHER

## 2017-05-30 NOTE — TELEPHONE ENCOUNTER
Khanh brought in some info on a new pain doctor Lisa would like to see.  His name is;    Dr. Charlie Knowles at [facility]  Fax #  [phone]    DX:   G89.4 (ICD-10-CM) - Chronic pain syndrome   M79.7 (ICD-10-CM) - Fibromyalgia     Thank you Dr. Burnette!

## 2017-05-30 NOTE — TELEPHONE ENCOUNTER
Khanh, Lisa's , called requesting to  Lisa's Oxycodone today instead of tomorrow.  He stated he has meeting all day tomorrow and will not be able to .  I have RX in an envelope at .  Notified Khanh it was ok to  today.

## 2017-06-22 ENCOUNTER — TELEPHONE (OUTPATIENT)
Dept: MEDICAL GROUP | Facility: PHYSICIAN GROUP | Age: 57
End: 2017-06-22

## 2017-06-22 NOTE — TELEPHONE ENCOUNTER
Khanh called me yesterday evening and left a message.  I called him this morning at his work #, and women stated he is off today. I will try him again tomorrow. He does not want calls to the house, just the work number only.

## 2017-06-27 DIAGNOSIS — G89.4 CHRONIC PAIN SYNDROME: ICD-10-CM

## 2017-06-27 RX ORDER — OXYCODONE HCL 40 MG/1
40 TABLET, FILM COATED, EXTENDED RELEASE ORAL EVERY 12 HOURS
Qty: 60 EACH | Refills: 0 | Status: SHIPPED | OUTPATIENT
Start: 2017-06-27 | End: 2017-06-27 | Stop reason: SDUPTHER

## 2017-06-27 RX ORDER — OXYCODONE HCL 40 MG/1
40 TABLET, FILM COATED, EXTENDED RELEASE ORAL EVERY 12 HOURS
Qty: 60 EACH | Refills: 0 | Status: SHIPPED | OUTPATIENT
Start: 2017-06-27 | End: 2018-05-11

## 2017-06-27 NOTE — TELEPHONE ENCOUNTER
Was the patient seen in the last year in this department? Yes     Does patient have an active prescription for medications requested? Yes - Pt will be out of prescription by 6/29/17.  Will you print on 6/29 for  to .     Received Request Via: Patient

## 2017-07-03 ENCOUNTER — TELEPHONE (OUTPATIENT)
Dept: MEDICAL GROUP | Facility: PHYSICIAN GROUP | Age: 57
End: 2017-07-03

## 2017-07-03 NOTE — TELEPHONE ENCOUNTER
I called pain doctor's office.  I verified they did receive the referral from Dr. Burnette.  The  stated referral was received, but they are very back logged and have not called Lisa. This referral was sent 5/30/17.  She stated that they will be giving Lisa a call very soon to set up this appointment.  In the meantime, Lisa has made an appointment to see Dr. Burnette on 7/27/17, due to medication running out.   I spoke to Khanh about this and let him know referral received and they should be calling him/her to schedule this appointment.  I also gave Dr. Benson Cassidy' office number if they have not heard from them within the week.

## 2017-08-17 RX ORDER — TRIPROLIDINE/PSEUDOEPHEDRINE 2.5MG-60MG
1 TABLET ORAL EVERY 6 HOURS PRN
Qty: 120 TAB | Refills: 0 | Status: SHIPPED | OUTPATIENT
Start: 2017-08-17 | End: 2017-09-13 | Stop reason: SDUPTHER

## 2017-10-13 RX ORDER — TRIPROLIDINE/PSEUDOEPHEDRINE 2.5MG-60MG
TABLET ORAL
Qty: 60 TAB | Refills: 0 | Status: SHIPPED | OUTPATIENT
Start: 2017-10-13 | End: 2017-10-31 | Stop reason: SDUPTHER

## 2017-10-13 NOTE — TELEPHONE ENCOUNTER
Patient needs follow-up appointment if she wants to continue getting refills. Only 15 day supply given.

## 2017-10-19 ENCOUNTER — HOSPITAL ENCOUNTER (OUTPATIENT)
Dept: LAB | Facility: MEDICAL CENTER | Age: 57
End: 2017-10-19
Attending: FAMILY MEDICINE
Payer: COMMERCIAL

## 2017-10-19 DIAGNOSIS — G89.4 CHRONIC PAIN SYNDROME: ICD-10-CM

## 2017-10-19 DIAGNOSIS — M79.7 FIBROMYALGIA: ICD-10-CM

## 2017-10-19 DIAGNOSIS — E03.9 HYPOTHYROIDISM, UNSPECIFIED TYPE: ICD-10-CM

## 2017-10-19 DIAGNOSIS — I48.0 PAROXYSMAL ATRIAL FIBRILLATION (HCC): ICD-10-CM

## 2017-10-19 DIAGNOSIS — I10 ESSENTIAL HYPERTENSION: ICD-10-CM

## 2017-10-19 LAB
ALBUMIN SERPL BCP-MCNC: 3.6 G/DL (ref 3.2–4.9)
ALBUMIN/GLOB SERPL: 1 G/DL
ALP SERPL-CCNC: 77 U/L (ref 30–99)
ALT SERPL-CCNC: 18 U/L (ref 2–50)
ANION GAP SERPL CALC-SCNC: 9 MMOL/L (ref 0–11.9)
AST SERPL-CCNC: 32 U/L (ref 12–45)
BASOPHILS # BLD AUTO: 1.1 % (ref 0–1.8)
BASOPHILS # BLD: 0.07 K/UL (ref 0–0.12)
BILIRUB SERPL-MCNC: 0.7 MG/DL (ref 0.1–1.5)
BUN SERPL-MCNC: 13 MG/DL (ref 8–22)
CALCIUM SERPL-MCNC: 8.7 MG/DL (ref 8.5–10.5)
CHLORIDE SERPL-SCNC: 107 MMOL/L (ref 96–112)
CHOLEST SERPL-MCNC: 123 MG/DL (ref 100–199)
CO2 SERPL-SCNC: 25 MMOL/L (ref 20–33)
CREAT SERPL-MCNC: 0.71 MG/DL (ref 0.5–1.4)
EOSINOPHIL # BLD AUTO: 0.15 K/UL (ref 0–0.51)
EOSINOPHIL NFR BLD: 2.4 % (ref 0–6.9)
ERYTHROCYTE [DISTWIDTH] IN BLOOD BY AUTOMATED COUNT: 48.8 FL (ref 35.9–50)
GFR SERPL CREATININE-BSD FRML MDRD: >60 ML/MIN/1.73 M 2
GLOBULIN SER CALC-MCNC: 3.6 G/DL (ref 1.9–3.5)
GLUCOSE SERPL-MCNC: 101 MG/DL (ref 65–99)
HCT VFR BLD AUTO: 49.6 % (ref 37–47)
HDLC SERPL-MCNC: 35 MG/DL
HGB BLD-MCNC: 16.1 G/DL (ref 12–16)
IMM GRANULOCYTES # BLD AUTO: 0.01 K/UL (ref 0–0.11)
IMM GRANULOCYTES NFR BLD AUTO: 0.2 % (ref 0–0.9)
LDLC SERPL CALC-MCNC: 59 MG/DL
LYMPHOCYTES # BLD AUTO: 1.99 K/UL (ref 1–4.8)
LYMPHOCYTES NFR BLD: 31.4 % (ref 22–41)
MCH RBC QN AUTO: 33 PG (ref 27–33)
MCHC RBC AUTO-ENTMCNC: 32.5 G/DL (ref 33.6–35)
MCV RBC AUTO: 101.6 FL (ref 81.4–97.8)
MONOCYTES # BLD AUTO: 0.6 K/UL (ref 0–0.85)
MONOCYTES NFR BLD AUTO: 9.5 % (ref 0–13.4)
NEUTROPHILS # BLD AUTO: 3.52 K/UL (ref 2–7.15)
NEUTROPHILS NFR BLD: 55.4 % (ref 44–72)
NRBC # BLD AUTO: 0 K/UL
NRBC BLD AUTO-RTO: 0 /100 WBC
PLATELET # BLD AUTO: 115 K/UL (ref 164–446)
PMV BLD AUTO: 11.2 FL (ref 9–12.9)
POTASSIUM SERPL-SCNC: 4.1 MMOL/L (ref 3.6–5.5)
PROT SERPL-MCNC: 7.2 G/DL (ref 6–8.2)
RBC # BLD AUTO: 4.88 M/UL (ref 4.2–5.4)
SODIUM SERPL-SCNC: 141 MMOL/L (ref 135–145)
TRIGL SERPL-MCNC: 145 MG/DL (ref 0–149)
TSH SERPL DL<=0.005 MIU/L-ACNC: 2.49 UIU/ML (ref 0.3–3.7)
WBC # BLD AUTO: 6.3 K/UL (ref 4.8–10.8)

## 2017-10-19 PROCEDURE — 80061 LIPID PANEL: CPT

## 2017-10-19 PROCEDURE — 36415 COLL VENOUS BLD VENIPUNCTURE: CPT

## 2017-10-19 PROCEDURE — 85025 COMPLETE CBC W/AUTO DIFF WBC: CPT

## 2017-10-19 PROCEDURE — 80053 COMPREHEN METABOLIC PANEL: CPT

## 2017-10-19 PROCEDURE — 84443 ASSAY THYROID STIM HORMONE: CPT

## 2017-10-20 ENCOUNTER — TELEPHONE (OUTPATIENT)
Dept: MEDICAL GROUP | Facility: PHYSICIAN GROUP | Age: 57
End: 2017-10-20

## 2017-10-20 NOTE — TELEPHONE ENCOUNTER
----- Message from Jaye Burnette M.D. sent at 10/20/2017  8:59 AM PDT -----  Patient should return for follow-up of medical problems and go over lab results if she wants to continue seeing me as her PCP.

## 2017-10-20 NOTE — TELEPHONE ENCOUNTER
Called and spoke to patients  Khanh. Khanh did not want to make a follow up for patient. Khanh said labs were ordered from cardiologist and does not need to come in and see Yomaira at this time. Khanh said he would pass on the message to Patient. LM

## 2017-10-24 ENCOUNTER — HOSPITAL ENCOUNTER (OUTPATIENT)
Facility: MEDICAL CENTER | Age: 57
End: 2017-10-24
Attending: SPECIALIST
Payer: COMMERCIAL

## 2017-10-24 PROCEDURE — 88175 CYTOPATH C/V AUTO FLUID REDO: CPT

## 2017-10-24 PROCEDURE — 87624 HPV HI-RISK TYP POOLED RSLT: CPT

## 2017-10-26 LAB — CYTOLOGY REG CYTOL: NORMAL

## 2017-10-31 ENCOUNTER — OFFICE VISIT (OUTPATIENT)
Dept: MEDICAL GROUP | Facility: PHYSICIAN GROUP | Age: 57
End: 2017-10-31
Payer: COMMERCIAL

## 2017-10-31 VITALS
HEIGHT: 65 IN | WEIGHT: 293 LBS | TEMPERATURE: 97.4 F | BODY MASS INDEX: 48.82 KG/M2 | OXYGEN SATURATION: 94 % | SYSTOLIC BLOOD PRESSURE: 130 MMHG | HEART RATE: 96 BPM | DIASTOLIC BLOOD PRESSURE: 82 MMHG | RESPIRATION RATE: 16 BRPM

## 2017-10-31 DIAGNOSIS — E03.9 HYPOTHYROIDISM, UNSPECIFIED TYPE: ICD-10-CM

## 2017-10-31 DIAGNOSIS — Z91.09 ENVIRONMENTAL ALLERGIES: ICD-10-CM

## 2017-10-31 DIAGNOSIS — I48.0 PAROXYSMAL ATRIAL FIBRILLATION (HCC): ICD-10-CM

## 2017-10-31 DIAGNOSIS — I10 ESSENTIAL HYPERTENSION: ICD-10-CM

## 2017-10-31 DIAGNOSIS — E66.01 MORBID OBESITY WITH BMI OF 50.0-59.9, ADULT (HCC): ICD-10-CM

## 2017-10-31 DIAGNOSIS — G89.4 CHRONIC PAIN SYNDROME: ICD-10-CM

## 2017-10-31 LAB
HPV HR 12 DNA CVX QL NAA+PROBE: NEGATIVE
HPV16 DNA SPEC QL NAA+PROBE: NEGATIVE
HPV18 DNA SPEC QL NAA+PROBE: NEGATIVE
SPECIMEN SOURCE: NORMAL

## 2017-10-31 PROCEDURE — 99214 OFFICE O/P EST MOD 30 MIN: CPT | Performed by: FAMILY MEDICINE

## 2017-10-31 RX ORDER — LEVOTHYROXINE SODIUM 0.07 MG/1
TABLET ORAL
Qty: 90 TAB | Refills: 3 | Status: SHIPPED | OUTPATIENT
Start: 2017-10-31 | End: 2018-10-22 | Stop reason: SDUPTHER

## 2017-10-31 RX ORDER — TRIPROLIDINE/PSEUDOEPHEDRINE 2.5MG-60MG
TABLET ORAL
Qty: 120 TAB | Refills: 5 | Status: SHIPPED | OUTPATIENT
Start: 2017-10-31 | End: 2018-01-03 | Stop reason: SDUPTHER

## 2017-10-31 ASSESSMENT — PAIN SCALES - GENERAL: PAINLEVEL: NO PAIN

## 2017-10-31 ASSESSMENT — PATIENT HEALTH QUESTIONNAIRE - PHQ9: CLINICAL INTERPRETATION OF PHQ2 SCORE: 0

## 2017-10-31 NOTE — LETTER
[facility]  Jaye Burnette M.D.  [address]  [place] [address]  Fax: [phone]   Authorization for Release/Disclosure of   Protected Health Information   Name: MARIFER RUIZ : 1960 SSN: [id number]   Address: [address] [place] [address]  [place] [address] Phone:    [phone] (home) [phone] (work)   I authorize the entity listed below to release/disclose the PHI below to:   [facility]/Jaye Burnette M.D. and Jaye Burnette M.D.   Provider or Entity Name:    [facility]   Address   City, State, [facility]   Phone:      Fax:     Reason for request: continuity of care   Information to be released:    [  ] LAST COLONOSCOPY,  including any PATH REPORT and follow-up  [  ] LAST FIT/COLOGUARD RESULT [  ] LAST DEXA  [ x ] LAST MAMMOGRAM  [  ] LAST PAP  [  ] LAST LABS [  ] RETINA EXAM REPORT  [  ] IMMUNIZATION RECORDS  [  ] Release all info      [  ] Check here and initial the line next to each item to release ALL health information INCLUDING  _____ Care and treatment for drug and / or alcohol abuse  _____ HIV testing, infection status, or AIDS  _____ Genetic Testing    DATES OF SERVICE OR TIME PERIOD TO BE DISCLOSED: _____________  I understand and acknowledge that:  * This Authorization may be revoked at any time by you in writing, except if your health information has already been used or disclosed.  * Your health information that will be used or disclosed as a result of you signing this authorization could be re-disclosed by the recipient. If this occurs, your re-disclosed health information may no longer be protected by State or Federal laws.  * You may refuse to sign this Authorization. Your refusal will not affect your ability to obtain treatment.  * This Authorization becomes effective upon signing and will  on (date) __________.      If no date is indicated, this Authorization will  one (1) year from the signature date.    Name: Marifer Ruiz    Signature:   Date:          10/31/2017       PLEASE FAX REQUESTED RECORDS BACK TO: [phone]

## 2017-10-31 NOTE — LETTER
[facility]  Jaye Burnette M.D.  [address]  [place] [address]  Fax: [phone]   Authorization for Release/Disclosure of   Protected Health Information   Name: MARIFER RUIZ : 1960 SSN: [id number]   Address: [address] [place] [address]  [place] [address] Phone:    [phone] (home) [phone] (work)   I authorize the entity listed below to release/disclose the PHI below to:   [facility]/Jaye Burnette M.D. and Jaye Burnette M.D.   Provider or Entity Name:    [facility]   Address   City, State, Zip   Phone:      Fax:     Reason for request: continuity of care   Information to be released:    [  ] LAST COLONOSCOPY,  including any PATH REPORT and follow-up  [  ] LAST FIT/COLOGUARD RESULT [  ] LAST DEXA  [  ] LAST MAMMOGRAM  [  ] LAST PAP  [  ] LAST LABS [  ] RETINA EXAM REPORT  [  ] IMMUNIZATION RECORDS  [  ] Release all info      [  ] Check here and initial the line next to each item to release ALL health information INCLUDING  _____ Care and treatment for drug and / or alcohol abuse  _____ HIV testing, infection status, or AIDS  _____ Genetic Testing    DATES OF SERVICE OR TIME PERIOD TO BE DISCLOSED: _____________  I understand and acknowledge that:  * This Authorization may be revoked at any time by you in writing, except if your health information has already been used or disclosed.  * Your health information that will be used or disclosed as a result of you signing this authorization could be re-disclosed by the recipient. If this occurs, your re-disclosed health information may no longer be protected by State or Federal laws.  * You may refuse to sign this Authorization. Your refusal will not affect your ability to obtain treatment.  * This Authorization becomes effective upon signing and will  on (date) __________.      If no date is indicated, this Authorization will  one (1) year from the signature date.    Name: Marifer Ruiz    Signature:   Date:          10/31/2017       PLEASE FAX REQUESTED RECORDS BACK TO: [phone]

## 2017-11-01 NOTE — PROGRESS NOTES
"Subjective:      Lisa Sandra is a 56 y.o. female who presents with Follow-Up            HPI     Patient is back for follow-up of her medical problems.    In terms of her hypothyroidism, she continues to take thyroid replacement. She had blood work to check her TSH recently.    Her blood pressure is under control on atenolol and lisinopril. Her cardiologist recently increased her atenolol to 50 mg twice daily. She is on low-dose lisinopril 2.5 mg daily.    In terms of her paroxysmal atrial fibrillation, she continues to take chronic anticoagulation with Pradaxa. She is followed by her cardiologist.    She continues to have problems with environmental allergens. This is all year round problem. She takes triprolidine for her symptoms with good control of them. She says she has been on this medication regularly for several years now.    In terms of her chronic pain syndrome due to fibromyalgia and chronic bilateral leg pain from prior skin infection, she established with Dr. Knowles, pain specialist. She is still on OxyContin 40 mg twice daily. She was started on Lyrica and she is now 100 mg 3 times a day. She said the medication is causing her to have lower extremity swelling and weight gain. She said she will talk to her pain specialist regarding the side effects.    Past medical history, past surgical history, family history reviewed-no changes    Social history reviewed-no changes    Allergies reviewed-no changes    Medications reviewed-no changes    ROS     Review of systems as per history of present illness, the rest are negative.       Objective:     /82   Pulse 96   Temp 36.3 °C (97.4 °F)   Resp 16   Ht 1.651 m (5' 5\")   Wt (!) 162.8 kg (359 lb)   SpO2 94%   Breastfeeding? No   BMI 59.74 kg/m²      Physical Exam     Examined alert, awake, oriented, not in distress    Neck-supple, no lymphadenopathy, no thyromegaly  Lungs-clear to auscultation, no rales, no wheezes  Heart-regular rate and " rhythm, no murmur  Extremities-no clubbing, no cyanosis, extremely obese lower extremity but no edema noted.     Hospital Outpatient Visit on 10/24/2017   Component Date Value   • Cytology Reg 10/26/2017 See Path Report    • Source 10/31/2017 Vaginal    • HPV Genotype 16 10/31/2017 Negative    • HPV Genotype 18 10/31/2017 Negative    • HPV Other High Risk Karine* 10/31/2017 Negative    Hospital Outpatient Visit on 10/19/2017   Component Date Value   • TSH 10/19/2017 2.490    • Cholesterol,Tot 10/19/2017 123    • Triglycerides 10/19/2017 145    • HDL 10/19/2017 35*   • LDL 10/19/2017 59    • Sodium 10/19/2017 141    • Potassium 10/19/2017 4.1    • Chloride 10/19/2017 107    • Co2 10/19/2017 25    • Anion Gap 10/19/2017 9.0    • Glucose 10/19/2017 101*   • Bun 10/19/2017 13    • Creatinine 10/19/2017 0.71    • Calcium 10/19/2017 8.7    • AST(SGOT) 10/19/2017 32    • ALT(SGPT) 10/19/2017 18    • Alkaline Phosphatase 10/19/2017 77    • Total Bilirubin 10/19/2017 0.7    • Albumin 10/19/2017 3.6    • Total Protein 10/19/2017 7.2    • Globulin 10/19/2017 3.6*   • A-G Ratio 10/19/2017 1.0    • WBC 10/19/2017 6.3    • RBC 10/19/2017 4.88    • Hemoglobin 10/19/2017 16.1*   • Hematocrit 10/19/2017 49.6*   • MCV 10/19/2017 101.6*   • MCH 10/19/2017 33.0    • MCHC 10/19/2017 32.5*   • RDW 10/19/2017 48.8    • Platelet Count 10/19/2017 115*   • MPV 10/19/2017 11.2    • Neutrophils-Polys 10/19/2017 55.40    • Lymphocytes 10/19/2017 31.40    • Monocytes 10/19/2017 9.50    • Eosinophils 10/19/2017 2.40    • Basophils 10/19/2017 1.10    • Immature Granulocytes 10/19/2017 0.20    • Nucleated RBC 10/19/2017 0.00    • Neutrophils (Absolute) 10/19/2017 3.52    • Lymphs (Absolute) 10/19/2017 1.99    • Monos (Absolute) 10/19/2017 0.60    • Eos (Absolute) 10/19/2017 0.15    • Baso (Absolute) 10/19/2017 0.07    • Immature Granulocytes (a* 10/19/2017 0.01    • NRBC (Absolute) 10/19/2017 0.00    • GFR If  10/19/2017 >60    • GFR  If Non  Ameri* 10/19/2017 >60         Assessment/Plan:     1. Hypothyroidism, unspecified type  Adequately replaced. Continue the same dose of thyroid medication.  - levothyroxine (SYNTHROID) 75 MCG Tab; TAKE ONE TABLET BY MOUTH ONE TIME DAILY  Dispense: 90 Tab; Refill: 3    2. Essential hypertension  Controlled on her medications.    3. Paroxysmal atrial fibrillation (CMS-HCC)  She is on A. fib. This is rate controlled. She is on atenolol. She is on chronic anticoagulation with pradaxa.She is followed by the cardiologist.    4. Chronic pain syndrome  She is now under the care of Dr. Charlie Knolwes. She is on oxycodone and Lyrica. She has problems with weight gain and lower extremity swelling with Lyrica.    5. Environmental allergies  I refilled her medication for allergies.  - triprolidine-PSE (APRODINE) 2.5-60 MG Tab; TAKE ONE TABLET BY MOUTH EVERY SIX HOURS AS NEEDED  Dispense: 120 Tab; Refill: 5    6. Morbid obesity with BMI of 50.0-59.9, adult (CMS-HCC)  We discussed diet, exercise and weight loss.  - Patient identified as having weight management issue.  Appropriate orders and counseling given.      Please note that this dictation was created using voice recognition software. I have worked with consultants from the vendor as well as technical experts from [employer] to optimize the interface. I have made every reasonable attempt to correct obvious errors, but I expect that there are errors of grammar and possibly content I did not discover before finalizing the note.

## 2018-01-03 DIAGNOSIS — Z91.09 ENVIRONMENTAL ALLERGIES: ICD-10-CM

## 2018-01-03 RX ORDER — TRIPROLIDINE/PSEUDOEPHEDRINE 2.5MG-60MG
TABLET ORAL
Qty: 360 TAB | Refills: 2 | Status: SHIPPED | OUTPATIENT
Start: 2018-01-03 | End: 2018-10-22 | Stop reason: SDUPTHER

## 2018-01-03 NOTE — TELEPHONE ENCOUNTER
Was the patient seen in the last year in this department? Yes     Does patient have an active prescription for medications requested? No     Received Request Via: Pharmacy       Patient would  Like a 90 day supply they say it is cheaper to get it this way than  juSt A 30 DAY

## 2018-05-10 ENCOUNTER — OFFICE VISIT (OUTPATIENT)
Dept: MEDICAL GROUP | Facility: PHYSICIAN GROUP | Age: 58
End: 2018-05-10
Payer: COMMERCIAL

## 2018-05-10 VITALS
OXYGEN SATURATION: 95 % | BODY MASS INDEX: 48.82 KG/M2 | HEART RATE: 99 BPM | HEIGHT: 65 IN | DIASTOLIC BLOOD PRESSURE: 80 MMHG | WEIGHT: 293 LBS | TEMPERATURE: 97.8 F | SYSTOLIC BLOOD PRESSURE: 120 MMHG

## 2018-05-10 DIAGNOSIS — I10 ESSENTIAL HYPERTENSION: ICD-10-CM

## 2018-05-10 DIAGNOSIS — G89.4 CHRONIC PAIN SYNDROME: ICD-10-CM

## 2018-05-10 DIAGNOSIS — E66.01 MORBID OBESITY (HCC): ICD-10-CM

## 2018-05-10 DIAGNOSIS — E03.9 HYPOTHYROIDISM, UNSPECIFIED TYPE: ICD-10-CM

## 2018-05-10 DIAGNOSIS — I48.0 PAROXYSMAL ATRIAL FIBRILLATION (HCC): ICD-10-CM

## 2018-05-10 DIAGNOSIS — Z91.09 ENVIRONMENTAL ALLERGIES: ICD-10-CM

## 2018-05-10 PROCEDURE — 99214 OFFICE O/P EST MOD 30 MIN: CPT | Performed by: FAMILY MEDICINE

## 2018-05-10 NOTE — LETTER
[employer] [facility]  Jaye Burnette M.D.  [address]  [place] [address]  Fax: [phone]   Authorization for Release/Disclosure of   Protected Health Information   Name: MARIFER SANDRA : 1960 SSN: [id number]   Address: [address] [place] [address]  [place] [address] Phone:    [phone] (home) [phone] (work)   I authorize the entity listed below to release/disclose the PHI below to:   [facility]/Jaye Burnette M.D. and Jaye Burnette M.D.   Provider or Entity Name:    Dr. Knowles - pain specialist   Address   City, [facility], [facility]   Phone:      Fax:     Reason for request: continuity of care   Information to be released:    [  ] LAST COLONOSCOPY,  including any PATH REPORT and follow-up  [  ] LAST FIT/COLOGUARD RESULT [  ] LAST DEXA  [  ] LAST MAMMOGRAM  [  ] LAST PAP  [  ] LAST LABS [  ] RETINA EXAM REPORT  [  ] IMMUNIZATION RECORDS  [ x ] Release all info      [  ] Check here and initial the line next to each item to release ALL health information INCLUDING  _____ Care and treatment for drug and / or alcohol abuse  _____ HIV testing, infection status, or AIDS  _____ Genetic Testing    DATES OF SERVICE OR TIME PERIOD TO BE DISCLOSED: _____________  I understand and acknowledge that:  * This Authorization may be revoked at any time by you in writing, except if your health information has already been used or disclosed.  * Your health information that will be used or disclosed as a result of you signing this authorization could be re-disclosed by the recipient. If this occurs, your re-disclosed health information may no longer be protected by State or Federal laws.  * You may refuse to sign this Authorization. Your refusal will not affect your ability to obtain treatment.  * This Authorization becomes effective upon signing and will  on (date) __________.      If no date is indicated, this Authorization will  one (1) year from the signature date.    Name: Marifer Sandra    Signature:   Date:          5/10/2018       PLEASE FAX REQUESTED RECORDS BACK TO: [phone]

## 2018-05-11 NOTE — PROGRESS NOTES
"Subjective:      Lisa Sandra is a 57 y.o. female who presents with Follow-Up            HPI     Patient returns for follow-up of her medical problems.    In terms of her paroxysmal atrial fibrillation, she continues to take atenolol and Pradaxa for chronic anticoagulation. She denies any chest pain, palpitations, shortness of breath. She continues to follow-up with her cardiologist Dr. Jane with the [facility] group.    Blood pressure is under control on atenolol and lisinopril. She denies side effects.    For her hypothyroidism, she continues to take thyroid replacement.    In terms of her environmental allergies which is an all year round problem, she continues to take triprolidine which helps control her symptoms.    She continues to follow-up with her pain specialist Dr. Knowles for her chronic pain syndrome due to fibromyalgia and chronic bilateral leg pain. She is on OxyContin 40 mg twice daily. She said she was tried on Lyrica but she couldn't tolerate side effects.    Past medical history, past surgical history, family history reviewed-no changes    Social history reviewed-no changes    Allergies reviewed-no changes    Medications reviewed-no changes      ROS     As per history of present illness, the rest are negative.       Objective:     /80   Pulse 99   Temp 36.6 °C (97.8 °F)   Ht 1.651 m (5' 5\")   Wt (!) 154 kg (339 lb 8.1 oz)   SpO2 95%   BMI 56.50 kg/m²      Physical Exam     Examined alert, awake, oriented, not in distress    Neck-supple, no lymphadenopathy, no thyromegaly  Lungs-clear to auscultation, no rales, no wheezes  Heart-regular rate and rhythm, no murmur, no A. fib appreciated at this time.  Extremities-no edema, clubbing, cyanosis          Assessment/Plan:     1. Paroxysmal atrial fibrillation (HCC)  Currently in sinus rhythm. She will continue to take anticoagulation Pradaxa and follow-up cardiologist regularly. She is also on atenolol. We will get CBC when she " does her blood work ordered by her cardiologist to be done in the next 6 months.  - CBC WITH DIFFERENTIAL; Future    2. Essential hypertension  Controlled on her medications.  - CBC WITH DIFFERENTIAL; Future    3. Hypothyroidism, unspecified type  Last TSH was adequately replaced in October 2017. We will do a follow-up TSH next visit.  - TSH; Future    4. Environmental allergies  Stable on her medication. Per patient and her cardiologist is aware that she is on triprolidine to control her allergies.    5. Chronic pain syndrome  She is doing well on her pain management per per pain specialist. She said she is happy with the care provided by her pain specialist. She says she is willing to follow all recommendations and she sees him every month. We have not been getting any visit notes from her specialist so records were requested.    6. Morbid obesity (HCC)  Discussed diet, exercise and weight loss.  - Patient identified as having weight management issue.  Appropriate orders and counseling given.      Please note that this dictation was created using voice recognition software. I have worked with consultants from the vendor as well as technical experts from [employer] to optimize the interface. I have made every reasonable attempt to correct obvious errors, but I expect that there are errors of grammar and possibly content I did not discover before finalizing the note.

## 2018-10-09 ENCOUNTER — HOSPITAL ENCOUNTER (OUTPATIENT)
Dept: LAB | Facility: MEDICAL CENTER | Age: 58
End: 2018-10-09
Attending: FAMILY MEDICINE
Payer: COMMERCIAL

## 2018-10-09 DIAGNOSIS — I10 ESSENTIAL HYPERTENSION: ICD-10-CM

## 2018-10-09 DIAGNOSIS — I48.0 PAROXYSMAL ATRIAL FIBRILLATION (HCC): ICD-10-CM

## 2018-10-09 DIAGNOSIS — E03.9 HYPOTHYROIDISM, UNSPECIFIED TYPE: ICD-10-CM

## 2018-10-09 LAB
ALBUMIN SERPL BCP-MCNC: 3.8 G/DL (ref 3.2–4.9)
ALBUMIN/GLOB SERPL: 1 G/DL
ALP SERPL-CCNC: 81 U/L (ref 30–99)
ALT SERPL-CCNC: 19 U/L (ref 2–50)
ANION GAP SERPL CALC-SCNC: 7 MMOL/L (ref 0–11.9)
AST SERPL-CCNC: 35 U/L (ref 12–45)
BASOPHILS # BLD AUTO: 0.7 % (ref 0–1.8)
BASOPHILS # BLD: 0.05 K/UL (ref 0–0.12)
BILIRUB SERPL-MCNC: 0.6 MG/DL (ref 0.1–1.5)
BUN SERPL-MCNC: 16 MG/DL (ref 8–22)
CALCIUM SERPL-MCNC: 9.2 MG/DL (ref 8.5–10.5)
CHLORIDE SERPL-SCNC: 104 MMOL/L (ref 96–112)
CHOLEST SERPL-MCNC: 141 MG/DL (ref 100–199)
CO2 SERPL-SCNC: 28 MMOL/L (ref 20–33)
CREAT SERPL-MCNC: 0.82 MG/DL (ref 0.5–1.4)
EOSINOPHIL # BLD AUTO: 0.17 K/UL (ref 0–0.51)
EOSINOPHIL NFR BLD: 2.4 % (ref 0–6.9)
ERYTHROCYTE [DISTWIDTH] IN BLOOD BY AUTOMATED COUNT: 48.8 FL (ref 35.9–50)
FASTING STATUS PATIENT QL REPORTED: NORMAL
GLOBULIN SER CALC-MCNC: 3.7 G/DL (ref 1.9–3.5)
GLUCOSE SERPL-MCNC: 133 MG/DL (ref 65–99)
HCT VFR BLD AUTO: 49.8 % (ref 37–47)
HDLC SERPL-MCNC: 43 MG/DL
HGB BLD-MCNC: 17 G/DL (ref 12–16)
IMM GRANULOCYTES # BLD AUTO: 0.02 K/UL (ref 0–0.11)
IMM GRANULOCYTES NFR BLD AUTO: 0.3 % (ref 0–0.9)
LDLC SERPL CALC-MCNC: 69 MG/DL
LYMPHOCYTES # BLD AUTO: 2.32 K/UL (ref 1–4.8)
LYMPHOCYTES NFR BLD: 32.2 % (ref 22–41)
MCH RBC QN AUTO: 35.1 PG (ref 27–33)
MCHC RBC AUTO-ENTMCNC: 34.1 G/DL (ref 33.6–35)
MCV RBC AUTO: 102.9 FL (ref 81.4–97.8)
MONOCYTES # BLD AUTO: 0.52 K/UL (ref 0–0.85)
MONOCYTES NFR BLD AUTO: 7.2 % (ref 0–13.4)
NEUTROPHILS # BLD AUTO: 4.13 K/UL (ref 2–7.15)
NEUTROPHILS NFR BLD: 57.2 % (ref 44–72)
NRBC # BLD AUTO: 0 K/UL
NRBC BLD-RTO: 0 /100 WBC
PLATELET # BLD AUTO: 124 K/UL (ref 164–446)
PMV BLD AUTO: 11.2 FL (ref 9–12.9)
POTASSIUM SERPL-SCNC: 4.2 MMOL/L (ref 3.6–5.5)
PROT SERPL-MCNC: 7.5 G/DL (ref 6–8.2)
RBC # BLD AUTO: 4.84 M/UL (ref 4.2–5.4)
SODIUM SERPL-SCNC: 139 MMOL/L (ref 135–145)
TRIGL SERPL-MCNC: 145 MG/DL (ref 0–149)
TSH SERPL DL<=0.005 MIU/L-ACNC: 2.29 UIU/ML (ref 0.38–5.33)
WBC # BLD AUTO: 7.2 K/UL (ref 4.8–10.8)

## 2018-10-09 PROCEDURE — 84443 ASSAY THYROID STIM HORMONE: CPT

## 2018-10-09 PROCEDURE — 80053 COMPREHEN METABOLIC PANEL: CPT

## 2018-10-09 PROCEDURE — 80061 LIPID PANEL: CPT

## 2018-10-09 PROCEDURE — 85025 COMPLETE CBC W/AUTO DIFF WBC: CPT

## 2018-10-09 PROCEDURE — 36415 COLL VENOUS BLD VENIPUNCTURE: CPT

## 2018-10-10 ENCOUNTER — TELEPHONE (OUTPATIENT)
Dept: MEDICAL GROUP | Facility: PHYSICIAN GROUP | Age: 58
End: 2018-10-10

## 2018-10-10 DIAGNOSIS — R73.9 HYPERGLYCEMIA: ICD-10-CM

## 2018-10-10 DIAGNOSIS — D75.1 POLYCYTHEMIA: ICD-10-CM

## 2018-10-10 NOTE — TELEPHONE ENCOUNTER
----- Message from Jaye Burnette M.D. sent at 10/10/2018  6:43 AM PDT -----  Blood work came back cholesterol panel all at [facility].  Blood sugar is elevated and this is now in the diabetic level at 133.  Diabetes is above 125.  Liver and kidney functions are normal.  Thyroid is normal.  Patient has high hemoglobin and hematocrit which means blood is more concentrated.  Platelets are slightly low which has been ongoing for the last 5 years.  We will send her for additional blood work to confirm diabetes as well as with the workup for elevation of the hemoglobin and hematocrit.  This is a nonfasting blood work.  She needs to get these done before her appointment with me in November.  Orders are in the computer.

## 2018-10-10 NOTE — TELEPHONE ENCOUNTER
Phone Number Called: [phone] (home) [phone] (work)      Message: Left voice mail for pt re reults. informed pt to call back     Left Message for patient to call back: yes

## 2018-10-10 NOTE — TELEPHONE ENCOUNTER
CMP and lipid panel were already done yesterday and she can use the same results for Dr. Jane her cardiologist.

## 2018-10-10 NOTE — TELEPHONE ENCOUNTER
[facility] wanted to know if you can order CMP, Lipid panel for Lisa as she was suppose to have them drawn for Dr. Jane and she never had the orders and they only kyrie what Dr. Burnette

## 2018-10-22 ENCOUNTER — HOSPITAL ENCOUNTER (OUTPATIENT)
Dept: LAB | Facility: MEDICAL CENTER | Age: 58
End: 2018-10-22
Attending: SPECIALIST
Payer: COMMERCIAL

## 2018-10-22 DIAGNOSIS — Z91.09 ENVIRONMENTAL ALLERGIES: ICD-10-CM

## 2018-10-22 DIAGNOSIS — E03.9 HYPOTHYROIDISM, UNSPECIFIED TYPE: ICD-10-CM

## 2018-10-22 PROCEDURE — 87624 HPV HI-RISK TYP POOLED RSLT: CPT

## 2018-10-22 PROCEDURE — 88175 CYTOPATH C/V AUTO FLUID REDO: CPT

## 2018-10-22 RX ORDER — PSEUDOEPHEDRINE HCL AND TRIPOLIDINE 60; 2.5 MG/1; MG/1
TABLET, FILM COATED ORAL
Qty: 360 TAB | Refills: 0 | Status: SHIPPED | OUTPATIENT
Start: 2018-10-22 | End: 2018-11-21 | Stop reason: SDUPTHER

## 2018-10-22 RX ORDER — LEVOTHYROXINE SODIUM 0.07 MG/1
TABLET ORAL
Qty: 90 TAB | Refills: 1 | Status: SHIPPED | OUTPATIENT
Start: 2018-10-22 | End: 2019-04-15 | Stop reason: SDUPTHER

## 2018-10-25 LAB
CYTOLOGY REG CYTOL: NORMAL
HPV HR 12 DNA CVX QL NAA+PROBE: NEGATIVE
HPV16 DNA SPEC QL NAA+PROBE: NEGATIVE
HPV18 DNA SPEC QL NAA+PROBE: NEGATIVE
SPECIMEN SOURCE: NORMAL

## 2018-11-14 ENCOUNTER — OFFICE VISIT (OUTPATIENT)
Dept: MEDICAL GROUP | Facility: PHYSICIAN GROUP | Age: 58
End: 2018-11-14
Payer: COMMERCIAL

## 2018-11-14 VITALS
SYSTOLIC BLOOD PRESSURE: 130 MMHG | OXYGEN SATURATION: 96 % | HEART RATE: 93 BPM | BODY MASS INDEX: 48.82 KG/M2 | DIASTOLIC BLOOD PRESSURE: 70 MMHG | TEMPERATURE: 97.1 F | WEIGHT: 293 LBS | HEIGHT: 65 IN

## 2018-11-14 DIAGNOSIS — R73.9 HYPERGLYCEMIA: ICD-10-CM

## 2018-11-14 DIAGNOSIS — I10 ESSENTIAL HYPERTENSION: ICD-10-CM

## 2018-11-14 DIAGNOSIS — Z12.39 SCREENING FOR BREAST CANCER: ICD-10-CM

## 2018-11-14 DIAGNOSIS — E03.9 HYPOTHYROIDISM, UNSPECIFIED TYPE: ICD-10-CM

## 2018-11-14 DIAGNOSIS — D75.1 ERYTHROCYTOSIS: ICD-10-CM

## 2018-11-14 LAB
HBA1C MFR BLD: 6 % (ref ?–5.8)
INT CON NEG: NEGATIVE
INT CON POS: POSITIVE

## 2018-11-14 PROCEDURE — 83036 HEMOGLOBIN GLYCOSYLATED A1C: CPT | Performed by: FAMILY MEDICINE

## 2018-11-14 PROCEDURE — 99214 OFFICE O/P EST MOD 30 MIN: CPT | Performed by: FAMILY MEDICINE

## 2018-11-14 ASSESSMENT — PATIENT HEALTH QUESTIONNAIRE - PHQ9: CLINICAL INTERPRETATION OF PHQ2 SCORE: 0

## 2018-11-14 NOTE — LETTER
On license of [facility]  Jaye Burnette M.D.  [address]  [place] [address]  Fax: [phone]   Authorization for Release/Disclosure of   Protected Health Information   Name: MARIFER SANDRA : 1960 SSN: [id number]   Address: [address] [place] [address]  [place] [address] Phone:    [phone] (home) [phone] (work)   I authorize the entity listed below to release/disclose the PHI below to:   On license of [facility]/Jaye Burnette M.D. and Jaye Burnette M.D.   Provider or Entity Name:    Dr. Bella   Address   City, State, [facility]   Phone:      Fax:     Reason for request: continuity of care   Information to be released:    [  ] LAST COLONOSCOPY,  including any PATH REPORT and follow-up  [  ] LAST FIT/COLOGUARD RESULT [  ] LAST DEXA  [  ] LAST MAMMOGRAM  [  ] LAST PAP  [  ] LAST LABS [  ] RETINA EXAM REPORT  [  ] IMMUNIZATION RECORDS  [ x ] Release all info      [  ] Check here and initial the line next to each item to release ALL health information INCLUDING  _____ Care and treatment for drug and / or alcohol abuse  _____ HIV testing, infection status, or AIDS  _____ Genetic Testing    DATES OF SERVICE OR TIME PERIOD TO BE DISCLOSED: _____________  I understand and acknowledge that:  * This Authorization may be revoked at any time by you in writing, except if your health information has already been used or disclosed.  * Your health information that will be used or disclosed as a result of you signing this authorization could be re-disclosed by the recipient. If this occurs, your re-disclosed health information may no longer be protected by State or Federal laws.  * You may refuse to sign this Authorization. Your refusal will not affect your ability to obtain treatment.  * This Authorization becomes effective upon signing and will  on (date) __________.      If no date is indicated, this Authorization will  one (1) year from the signature date.    Name: Marifer Sandra    Signature:   Date:     2018            PLEASE FAX REQUESTED RECORDS BACK TO: [phone]

## 2018-11-15 NOTE — PROGRESS NOTES
Subjective:      Lisa Sandra is a 58 y.o. female who presents with Atrial Fibrillation (PAF)            HPI     She is here for follow-up of her medical problems.    The most recent blood work came back her blood sugar was elevated at 133.  She said she did not fast for 12 hours when she did that blood work and she insists her blood sugars have always been running good.  I have sent her for additional blood work to check the hemoglobin A1c which she did not do because she did not think this is necessary.    Her most recent blood work also showed that her hemoglobin and hematocrit have been higher than her baseline and hemoglobin increased from 16-17.  She said Dr. Avila who was her previous PCP already referred her to the hematologist a few years ago for this problem.  I reviewed her records and she was seen by Dr. Bella in 2012.  She was sent for additional blood work which she said she did which all came back within normal limit and she was told that no further intervention is needed.  I ordered additional blood work to check her erythropoietin, ferritin, iron and transferrin which she did not do because of the fact that she has been already worked up by the specialist before.    She continues to take thyroid replacement for hypothyroidism.  The last TSH was adequately replaced and in October 2018.    In terms of her hypertension, this is under control on her atenolol and lisinopril.      She continues to follow-up with her cardiologist for paroxysmal atrial fibrillation.  She continues to take Pradaxa for oral anticoagulation.  She denies any chest pain, palpitations, shortness of breath, leg edema.    Past medical history, past surgical history, family history reviewed-no changes    Social history reviewed-no changes    Allergies reviewed-no changes    Medications reviewed-no changes        ROS     As per HPI, the rest are negative.       Objective:     /70 (BP Location: Left arm, Patient  "Position: Sitting, BP Cuff Size: Adult)   Pulse 93   Temp 36.2 °C (97.1 °F) (Temporal)   Ht 1.651 m (5' 5\")   Wt (!) 160.1 kg (352 lb 15.3 oz)   SpO2 96%   BMI 58.73 kg/m²      Physical Exam     Examined alert, awake, oriented, not in distress    Neck-supple, no lymphadenopathy, no thyromegaly  Lungs-clear to auscultation, no rales, no wheezes  Heart-regular rate and rhythm, no A. fib, no murmur  Extremities-no edema, clubbing, cyanosis         Results for MARIFER RUIZ (MRN [id number]) as of 11/15/2018 13:21   Ref. Range 10/19/2017 14:47 10/9/2018 09:13   WBC Latest Ref Range: 4.8 - 10.8 K/uL 6.3 7.2   RBC Latest Ref Range: 4.20 - 5.40 M/uL 4.88 4.84   Hemoglobin Latest Ref Range: 12.0 - 16.0 g/dL 16.1 (H) 17.0 (H)   Hematocrit Latest Ref Range: 37.0 - 47.0 % 49.6 (H) 49.8 (H)   MCV Latest Ref Range: 81.4 - 97.8 fL 101.6 (H) 102.9 (H)   MCH Latest Ref Range: 27.0 - 33.0 pg 33.0 35.1 (H)   MCHC Latest Ref Range: 33.6 - 35.0 g/dL 32.5 (L) 34.1   RDW Latest Ref Range: 35.9 - 50.0 fL 48.8 48.8   Platelet Count Latest Ref Range: 164 - 446 K/uL 115 (L) 124 (L)   MPV Latest Ref Range: 9.0 - 12.9 fL 11.2 11.2   Neutrophils-Polys Latest Ref Range: 44.00 - 72.00 % 55.40 57.20   Neutrophils (Absolute) Latest Ref Range: 2.00 - 7.15 K/uL 3.52 4.13   Lymphocytes Latest Ref Range: 22.00 - 41.00 % 31.40 32.20   Lymphs (Absolute) Latest Ref Range: 1.00 - 4.80 K/uL 1.99 2.32   Monocytes Latest Ref Range: 0.00 - 13.40 % 9.50 7.20   Monos (Absolute) Latest Ref Range: 0.00 - 0.85 K/uL 0.60 0.52   Eosinophils Latest Ref Range: 0.00 - 6.90 % 2.40 2.40   Eos (Absolute) Latest Ref Range: 0.00 - 0.51 K/uL 0.15 0.17   Basophils Latest Ref Range: 0.00 - 1.80 % 1.10 0.70   Baso (Absolute) Latest Ref Range: 0.00 - 0.12 K/uL 0.07 0.05   Immature Granulocytes Latest Ref Range: 0.00 - 0.90 % 0.20 0.30   Immature Granulocytes (abs) Latest Ref Range: 0.00 - 0.11 K/uL 0.01 0.02   Nucleated RBC Latest Units: /100 WBC 0.00 0.00   NRBC " (Absolute) Latest Units: K/uL 0.00 0.00   Results for MARIFER RUIZ (MRN [id number]) as of 11/15/2018 13:21   Ref. Range 10/19/2017 14:47 10/9/2018 09:13 11/14/2018 16:47   Sodium Latest Ref Range: 135 - 145 mmol/L 141 139    Potassium Latest Ref Range: 3.6 - 5.5 mmol/L 4.1 4.2    Chloride Latest Ref Range: 96 - 112 mmol/L 107 104    Co2 Latest Ref Range: 20 - 33 mmol/L 25 28    Anion Gap Latest Ref Range: 0.0 - 11.9  9.0 7.0    Glucose Latest Ref Range: 65 - 99 mg/dL 101 (H) 133 (H)    Bun Latest Ref Range: 8 - 22 mg/dL 13 16    Creatinine Latest Ref Range: 0.50 - 1.40 mg/dL 0.71 0.82    GFR If  Latest Ref Range: >60 mL/min/1.73 m 2 >60 >60    GFR If Non  Latest Ref Range: >60 mL/min/1.73 m 2 >60 >60    Calcium Latest Ref Range: 8.5 - 10.5 mg/dL 8.7 9.2    AST(SGOT) Latest Ref Range: 12 - 45 U/L 32 35    ALT(SGPT) Latest Ref Range: 2 - 50 U/L 18 19    Alkaline Phosphatase Latest Ref Range: 30 - 99 U/L 77 81    Total Bilirubin Latest Ref Range: 0.1 - 1.5 mg/dL 0.7 0.6    Albumin Latest Ref Range: 3.2 - 4.9 g/dL 3.6 3.8    Total Protein Latest Ref Range: 6.0 - 8.2 g/dL 7.2 7.5    Globulin Latest Ref Range: 1.9 - 3.5 g/dL 3.6 (H) 3.7 (H)    A-G Ratio Latest Units: g/dL 1.0 1.0    Glycohemoglobin Latest Units: %   6.0   Fasting Status Unknown  Fasting    Cholesterol,Tot Latest Ref Range: 100 - 199 mg/dL 123 141    Triglycerides Latest Ref Range: 0 - 149 mg/dL 145 145    HDL Latest Ref Range: >=40 mg/dL 35 (A) 43    LDL Latest Ref Range: <100 mg/dL 59 69    Results for MARIFER RUIZ (MRN [id number]) as of 11/15/2018 13:21   Ref. Range 10/9/2018 09:13   TSH Latest Ref Range: 0.380 - 5.330 uIU/mL 2.290       Assessment/Plan:     1. Hyperglycemia  We did a p.o. CT hemoglobin A1c in the office and it was 6.0.  This is consistent with impaired fasting blood sugar/prediabetes.  We discussed the plate method for the diet.  We discussed weight loss, regular exercises.  I offered referral to  dietitian or to our weight loss program but she declined.  She said she knows what she needs to do.  We will do follow-up blood work in 6 months.  - Lipid Profile; Future  - COMP METABOLIC PANEL; Future  - HEMOGLOBIN A1C; Future  - CBC WITH DIFFERENTIAL; Future  - TSH; Future  - POCT  A1C    2. Erythrocytosis  She does not want any further workup since she has been already evaluated by hematologist.  I will get records from Dr. Bella.  - Lipid Profile; Future  - COMP METABOLIC PANEL; Future  - HEMOGLOBIN A1C; Future  - CBC WITH DIFFERENTIAL; Future  - TSH; Future    3. Hypothyroidism, unspecified type  Adequately replaced per last blood work.  We will do follow-up blood work next visit.  - Lipid Profile; Future  - COMP METABOLIC PANEL; Future  - HEMOGLOBIN A1C; Future  - CBC WITH DIFFERENTIAL; Future  - TSH; Future    4. Essential hypertension  Controlled on her medications.  - Lipid Profile; Future  - COMP METABOLIC PANEL; Future  - HEMOGLOBIN A1C; Future  - CBC WITH DIFFERENTIAL; Future  - TSH; Future    5. Screening for breast cancer  She needs screening mammogram and she will schedule one.  She gets her mammogram done at [facility] and she will make sure I get a copy of the results.  - MA-SCREEN MAMMO W/CAD-BILAT; Future      Please note that this dictation was created using voice recognition software. I have worked with consultants from the vendor as well as technical experts from [employer] to optimize the interface. I have made every reasonable attempt to correct obvious errors, but I expect that there are errors of grammar and possibly content I did not discover before finalizing the note.

## 2018-11-21 DIAGNOSIS — Z91.09 ENVIRONMENTAL ALLERGIES: ICD-10-CM

## 2018-11-21 RX ORDER — TRIPROLIDINE/PSEUDOEPHEDRINE 2.5MG-60MG
1 TABLET ORAL EVERY 6 HOURS PRN
Qty: 264 TAB | Refills: 1 | Status: SHIPPED | OUTPATIENT
Start: 2018-11-21 | End: 2019-10-28 | Stop reason: SDUPTHER

## 2019-04-15 DIAGNOSIS — E03.9 HYPOTHYROIDISM, UNSPECIFIED TYPE: ICD-10-CM

## 2019-04-15 RX ORDER — LEVOTHYROXINE SODIUM 0.07 MG/1
TABLET ORAL
Qty: 30 TAB | Refills: 5 | Status: SHIPPED | OUTPATIENT
Start: 2019-04-15 | End: 2019-09-30 | Stop reason: SDUPTHER

## 2019-09-30 DIAGNOSIS — E03.9 HYPOTHYROIDISM, UNSPECIFIED TYPE: ICD-10-CM

## 2019-09-30 RX ORDER — LEVOTHYROXINE SODIUM 0.07 MG/1
TABLET ORAL
Qty: 30 TAB | Refills: 5 | Status: SHIPPED | OUTPATIENT
Start: 2019-09-30 | End: 2020-03-18

## 2019-10-16 ENCOUNTER — HOSPITAL ENCOUNTER (OUTPATIENT)
Dept: LAB | Facility: MEDICAL CENTER | Age: 59
End: 2019-10-16
Attending: FAMILY MEDICINE
Payer: COMMERCIAL

## 2019-10-16 DIAGNOSIS — I10 ESSENTIAL HYPERTENSION: ICD-10-CM

## 2019-10-16 DIAGNOSIS — R73.9 HYPERGLYCEMIA: ICD-10-CM

## 2019-10-16 DIAGNOSIS — D75.1 ERYTHROCYTOSIS: ICD-10-CM

## 2019-10-16 DIAGNOSIS — E03.9 HYPOTHYROIDISM, UNSPECIFIED TYPE: ICD-10-CM

## 2019-10-16 LAB
ALBUMIN SERPL BCP-MCNC: 3.7 G/DL (ref 3.2–4.9)
ALBUMIN/GLOB SERPL: 1.1 G/DL
ALP SERPL-CCNC: 75 U/L (ref 30–99)
ALT SERPL-CCNC: 19 U/L (ref 2–50)
ANION GAP SERPL CALC-SCNC: 9 MMOL/L (ref 0–11.9)
AST SERPL-CCNC: 27 U/L (ref 12–45)
BASOPHILS # BLD AUTO: 0.6 % (ref 0–1.8)
BASOPHILS # BLD: 0.04 K/UL (ref 0–0.12)
BILIRUB SERPL-MCNC: 0.8 MG/DL (ref 0.1–1.5)
BUN SERPL-MCNC: 20 MG/DL (ref 8–22)
CALCIUM SERPL-MCNC: 8.6 MG/DL (ref 8.5–10.5)
CHLORIDE SERPL-SCNC: 106 MMOL/L (ref 96–112)
CHOLEST SERPL-MCNC: 143 MG/DL (ref 100–199)
CO2 SERPL-SCNC: 27 MMOL/L (ref 20–33)
CREAT SERPL-MCNC: 0.85 MG/DL (ref 0.5–1.4)
EOSINOPHIL # BLD AUTO: 0.16 K/UL (ref 0–0.51)
EOSINOPHIL NFR BLD: 2.6 % (ref 0–6.9)
ERYTHROCYTE [DISTWIDTH] IN BLOOD BY AUTOMATED COUNT: 50.1 FL (ref 35.9–50)
FASTING STATUS PATIENT QL REPORTED: NORMAL
GLOBULIN SER CALC-MCNC: 3.5 G/DL (ref 1.9–3.5)
GLUCOSE SERPL-MCNC: 83 MG/DL (ref 65–99)
HCT VFR BLD AUTO: 50.8 % (ref 37–47)
HDLC SERPL-MCNC: 45 MG/DL
HGB BLD-MCNC: 16.8 G/DL (ref 12–16)
IMM GRANULOCYTES # BLD AUTO: 0.01 K/UL (ref 0–0.11)
IMM GRANULOCYTES NFR BLD AUTO: 0.2 % (ref 0–0.9)
LDLC SERPL CALC-MCNC: 75 MG/DL
LYMPHOCYTES # BLD AUTO: 2.15 K/UL (ref 1–4.8)
LYMPHOCYTES NFR BLD: 34.7 % (ref 22–41)
MCH RBC QN AUTO: 34.6 PG (ref 27–33)
MCHC RBC AUTO-ENTMCNC: 33.1 G/DL (ref 33.6–35)
MCV RBC AUTO: 104.7 FL (ref 81.4–97.8)
MONOCYTES # BLD AUTO: 0.53 K/UL (ref 0–0.85)
MONOCYTES NFR BLD AUTO: 8.6 % (ref 0–13.4)
NEUTROPHILS # BLD AUTO: 3.3 K/UL (ref 2–7.15)
NEUTROPHILS NFR BLD: 53.3 % (ref 44–72)
NRBC # BLD AUTO: 0 K/UL
NRBC BLD-RTO: 0 /100 WBC
PLATELET # BLD AUTO: 115 K/UL (ref 164–446)
PMV BLD AUTO: 10.7 FL (ref 9–12.9)
POTASSIUM SERPL-SCNC: 4 MMOL/L (ref 3.6–5.5)
PROT SERPL-MCNC: 7.2 G/DL (ref 6–8.2)
RBC # BLD AUTO: 4.85 M/UL (ref 4.2–5.4)
SODIUM SERPL-SCNC: 142 MMOL/L (ref 135–145)
TRIGL SERPL-MCNC: 115 MG/DL (ref 0–149)
TSH SERPL DL<=0.005 MIU/L-ACNC: 1.28 UIU/ML (ref 0.38–5.33)
WBC # BLD AUTO: 6.2 K/UL (ref 4.8–10.8)

## 2019-10-16 PROCEDURE — 85025 COMPLETE CBC W/AUTO DIFF WBC: CPT

## 2019-10-16 PROCEDURE — 80061 LIPID PANEL: CPT

## 2019-10-16 PROCEDURE — 36415 COLL VENOUS BLD VENIPUNCTURE: CPT

## 2019-10-16 PROCEDURE — 83036 HEMOGLOBIN GLYCOSYLATED A1C: CPT

## 2019-10-16 PROCEDURE — 84443 ASSAY THYROID STIM HORMONE: CPT

## 2019-10-16 PROCEDURE — 80053 COMPREHEN METABOLIC PANEL: CPT

## 2019-10-17 LAB
EST. AVERAGE GLUCOSE BLD GHB EST-MCNC: 123 MG/DL
HBA1C MFR BLD: 5.9 % (ref 0–5.6)

## 2019-10-28 DIAGNOSIS — Z91.09 ENVIRONMENTAL ALLERGIES: ICD-10-CM

## 2019-10-28 RX ORDER — PSEUDOEPHEDRINE HCL AND TRIPOLIDINE 60; 2.5 MG/1; MG/1
TABLET, FILM COATED ORAL
Qty: 264 TAB | Refills: 1 | Status: SHIPPED | OUTPATIENT
Start: 2019-10-28 | End: 2020-02-20

## 2019-10-31 ENCOUNTER — OFFICE VISIT (OUTPATIENT)
Dept: MEDICAL GROUP | Facility: PHYSICIAN GROUP | Age: 59
End: 2019-10-31
Payer: COMMERCIAL

## 2019-10-31 VITALS
HEIGHT: 65 IN | WEIGHT: 293 LBS | SYSTOLIC BLOOD PRESSURE: 120 MMHG | BODY MASS INDEX: 48.82 KG/M2 | TEMPERATURE: 97.2 F | HEART RATE: 88 BPM | OXYGEN SATURATION: 98 % | DIASTOLIC BLOOD PRESSURE: 70 MMHG

## 2019-10-31 DIAGNOSIS — I48.0 PAROXYSMAL ATRIAL FIBRILLATION (HCC): ICD-10-CM

## 2019-10-31 DIAGNOSIS — E03.9 HYPOTHYROIDISM, UNSPECIFIED TYPE: ICD-10-CM

## 2019-10-31 DIAGNOSIS — D69.6 THROMBOCYTOPENIA (HCC): ICD-10-CM

## 2019-10-31 DIAGNOSIS — D75.1 ERYTHROCYTOSIS: ICD-10-CM

## 2019-10-31 PROCEDURE — 99214 OFFICE O/P EST MOD 30 MIN: CPT | Performed by: FAMILY MEDICINE

## 2019-10-31 RX ORDER — MULTIVIT WITH CALCIUM,IRON,MIN
TABLET ORAL
COMMUNITY

## 2019-10-31 ASSESSMENT — PATIENT HEALTH QUESTIONNAIRE - PHQ9: CLINICAL INTERPRETATION OF PHQ2 SCORE: 0

## 2019-10-31 NOTE — PROGRESS NOTES
Subjective:      Lisa Sandra is a 58 y.o. female who presents with Other (general health)    HPI:    The patient is here for follow up on her chronic medical problems.    Hypothyroidism  Patient continues to take thyroid replacement medication as prescribed. Blood work was completed prior to this visit which showed her TSH was normal at 1.28    Paroxysmal Atrial Fibrillation  She continues to follow her cardiologist Dr. Jane with [facility] for paroxysmal atrial fibrillation.  She was switched from Pradaxa to Xarelto because of insurance coverage.  She notes that she gets mild nosebleeds on Xarelto which she did not have with Pradaxa, however denies any chest pain, palpitations, shortness of breath, leg edema.    Erythrocytosis  Thrombocytopenia (HCC)  She has chronic/long-standing mild elevation of hemoglobin and hematocrit and mild thrombocytopenia.  She has been previously referred to hematology by Dr. Avila her previous PCP. She was seen by Dr. Rodgers in 2012.  Dr. Rodgers's note stated that LAKESHIA was ordered as well as abdominal ultrasound to rule out splenomegaly with a return follow-up in 1 month.  She claims she completed all the tests that were ordered but the old records from the hematologist showed she was only seen once and there were no results of the tests that were ordered. I ordered for additional blood work to check her erythropoietin, ferritin, iron and transferrin, however the patient did not complete these because she states that she already completed work-up in the past and she does not think they needed to be read done.    Past medical history, past surgical history, family history reviewed-no changes    Social history reviewed-no changes    Allergies reviewed-no changes    Medications reviewed-no changes    ROS:  As per the HPI as shown above, the rest are negative.       Objective:     /70 (BP Location: Left arm, Patient Position: Sitting, BP Cuff Size: Adult)    "Pulse 88   Temp 36.2 °C (97.2 °F) (Temporal)   Ht 1.651 m (5' 5\")   Wt (!) 149.1 kg (328 lb 11.3 oz)   SpO2 98%   BMI 54.70 kg/m²     Physical Exam  Examined alert, awake, oriented, not in distress    Neck-supple, no lymphadenopathy, no thyromegaly  Lungs-clear to auscultation, no rales, no wheezes  Heart-regular rate, irregularly irregular rhythm consistent with A. fib, no murmur  Extremities-no edema, clubbing, cyanosis     Labs:  No visits with results within 2 Week(s) from this visit.   Latest known visit with results is:   Hospital Outpatient Visit on 10/16/2019   Component Date Value Ref Range Status   • TSH 10/16/2019 1.280  0.380 - 5.330 uIU/mL Final    Comment: Please note new reference ranges effective 12/14/2017 10:00 AM  Pregnant Females, 1st Trimester  0.050-3.700  Pregnant Females, 2nd Trimester  0.310-4.350  Pregnant Females, 3rd Trimester  0.410-5.180     • WBC 10/16/2019 6.2  4.8 - 10.8 K/uL Final   • RBC 10/16/2019 4.85  4.20 - 5.40 M/uL Final   • Hemoglobin 10/16/2019 16.8* 12.0 - 16.0 g/dL Final   • Hematocrit 10/16/2019 50.8* 37.0 - 47.0 % Final   • MCV 10/16/2019 104.7* 81.4 - 97.8 fL Final   • MCH 10/16/2019 34.6* 27.0 - 33.0 pg Final   • MCHC 10/16/2019 33.1* 33.6 - 35.0 g/dL Final   • RDW 10/16/2019 50.1* 35.9 - 50.0 fL Final   • Platelet Count 10/16/2019 115* 164 - 446 K/uL Final   • MPV 10/16/2019 10.7  9.0 - 12.9 fL Final   • Neutrophils-Polys 10/16/2019 53.30  44.00 - 72.00 % Final   • Lymphocytes 10/16/2019 34.70  22.00 - 41.00 % Final   • Monocytes 10/16/2019 8.60  0.00 - 13.40 % Final   • Eosinophils 10/16/2019 2.60  0.00 - 6.90 % Final   • Basophils 10/16/2019 0.60  0.00 - 1.80 % Final   • Immature Granulocytes 10/16/2019 0.20  0.00 - 0.90 % Final   • Nucleated RBC 10/16/2019 0.00  /100 WBC Final   • Neutrophils (Absolute) 10/16/2019 3.30  2.00 - 7.15 K/uL Final    Includes immature neutrophils, if present.   • Lymphs (Absolute) 10/16/2019 2.15  1.00 - 4.80 K/uL Final   • Monos " (Absolute) 10/16/2019 0.53  0.00 - 0.85 K/uL Final   • Eos (Absolute) 10/16/2019 0.16  0.00 - 0.51 K/uL Final   • Baso (Absolute) 10/16/2019 0.04  0.00 - 0.12 K/uL Final   • Immature Granulocytes (abs) 10/16/2019 0.01  0.00 - 0.11 K/uL Final   • NRBC (Absolute) 10/16/2019 0.00  K/uL Final   • Glycohemoglobin 10/16/2019 5.9* 0.0 - 5.6 % Final    Comment: Increased risk for diabetes:  5.7 -6.4%  Diabetes:  >6.4%  Glycemic control for adults with diabetes:  <7.0%  The above interpretations are per ADA guidelines.  Diagnosis  of diabetes mellitus on the basis of elevated Hemoglobin A1c  should be confirmed by repeating the Hb A1c test.     • Est Avg Glucose 10/16/2019 123  mg/dL Final    Comment: The eAG calculation is based on the A1c-Derived Daily Glucose  (ADAG) study.  See the ADA's website for additional information.     • Sodium 10/16/2019 142  135 - 145 mmol/L Final   • Potassium 10/16/2019 4.0  3.6 - 5.5 mmol/L Final   • Chloride 10/16/2019 106  96 - 112 mmol/L Final   • Co2 10/16/2019 27  20 - 33 mmol/L Final   • Anion Gap 10/16/2019 9.0  0.0 - 11.9 Final   • Glucose 10/16/2019 83  65 - 99 mg/dL Final   • Bun 10/16/2019 20  8 - 22 mg/dL Final   • Creatinine 10/16/2019 0.85  0.50 - 1.40 mg/dL Final   • Calcium 10/16/2019 8.6  8.5 - 10.5 mg/dL Final   • AST(SGOT) 10/16/2019 27  12 - 45 U/L Final   • ALT(SGPT) 10/16/2019 19  2 - 50 U/L Final   • Alkaline Phosphatase 10/16/2019 75  30 - 99 U/L Final   • Total Bilirubin 10/16/2019 0.8  0.1 - 1.5 mg/dL Final   • Albumin 10/16/2019 3.7  3.2 - 4.9 g/dL Final   • Total Protein 10/16/2019 7.2  6.0 - 8.2 g/dL Final   • Globulin 10/16/2019 3.5  1.9 - 3.5 g/dL Final   • A-G Ratio 10/16/2019 1.1  g/dL Final   • Cholesterol,Tot 10/16/2019 143  100 - 199 mg/dL Final   • Triglycerides 10/16/2019 115  0 - 149 mg/dL Final   • HDL 10/16/2019 45  >=40 mg/dL Final   • LDL 10/16/2019 75  <100 mg/dL Final   • Fasting Status 10/16/2019 Fasting   Final   • GFR If   10/16/2019 >60  >60 mL/min/1.73 m 2 Final   • GFR If Non  10/16/2019 >60  >60 mL/min/1.73 m 2 Final          Assessment/Plan:     1. Erythrocytosis  Patient has been stable over the years.  She claims she already had complete work-up done by hematologist before although the old records did not show this.  She states she will look into her records and see if she has seen a different hematologist who may have done the complete work-up.      2. Thrombocytopenia (HCC)  She has long-standing mild thrombocytopenia stable over the years.  Plan the same as #1.    3. Hypothyroidism, unspecified type  Stable on thyroid replacement medications. TSH is adequately replaced at 1.289. We will continue the current plan of care.    4. Paroxysmal atrial fibrillation (HCC)  Patient has been stable with current management of Xarelto. We will make no changes for now and she will continue to follow her cardiologist.      Manav GOOD ([place]), am scribing for, and in the presence of, Jaye Burnette MD     Electronically signed by: Manav Yañez ([place]), 10/31/2019    Jaye GOOD MD personally performed the services described in this documentation, as scribed by Manav Yañez in my presence, and it is both accurate and complete.

## 2019-10-31 NOTE — LETTER
[employer] [facility]  Jaye Burnette M.D.  [address]  [place] [address]  Fax: [phone]   Authorization for Release/Disclosure of   Protected Health Information   Name: MARIFER RUIZ : 1960 SSN: [id number]   Address: [address] [place] [address]  [place] [address] Phone:    [phone] (home) [phone] (work)   I authorize the entity listed below to release/disclose the PHI below to:   [employer] [facility]/Jaye Burnette M.D. and Jaye Burnette M.D.   Provider or Entity Name:    [facility]   Address   City, [facility]   Phone:      Fax:     Reason for request: continuity of care   Information to be released:    [  ] LAST COLONOSCOPY,  including any PATH REPORT and follow-up  [  ] LAST FIT/COLOGUARD RESULT [  ] LAST DEXA  [ x ] LAST MAMMOGRAM  [  ] LAST PAP  [  ] LAST LABS [  ] RETINA EXAM REPORT  [  ] IMMUNIZATION RECORDS  [  ] Release all info      [  ] Check here and initial the line next to each item to release ALL health information INCLUDING  _____ Care and treatment for drug and / or alcohol abuse  _____ HIV testing, infection status, or AIDS  _____ Genetic Testing    DATES OF SERVICE OR TIME PERIOD TO BE DISCLOSED: _____________  I understand and acknowledge that:  * This Authorization may be revoked at any time by you in writing, except if your health information has already been used or disclosed.  * Your health information that will be used or disclosed as a result of you signing this authorization could be re-disclosed by the recipient. If this occurs, your re-disclosed health information may no longer be protected by State or Federal laws.  * You may refuse to sign this Authorization. Your refusal will not affect your ability to obtain treatment.  * This Authorization becomes effective upon signing and will  on (date) __________.      If no date is indicated, this Authorization will  one (1) year from the signature date.    Name: Marifer Ruiz    Signature:   Date:          10/31/2019       PLEASE FAX REQUESTED RECORDS BACK TO: [phone]

## 2020-06-15 DIAGNOSIS — Z91.09 ENVIRONMENTAL ALLERGIES: ICD-10-CM

## 2020-06-15 RX ORDER — PSEUDOEPHEDRINE HCL AND TRIPOLIDINE 60; 2.5 MG/1; MG/1
TABLET, FILM COATED ORAL
Qty: 264 TAB | Refills: 0 | Status: SHIPPED | OUTPATIENT
Start: 2020-06-15 | End: 2020-07-13

## 2020-07-13 DIAGNOSIS — Z91.09 ENVIRONMENTAL ALLERGIES: ICD-10-CM

## 2020-07-13 RX ORDER — PSEUDOEPHEDRINE HCL AND TRIPOLIDINE 60; 2.5 MG/1; MG/1
TABLET, FILM COATED ORAL
Qty: 264 TAB | Refills: 0 | Status: SHIPPED | OUTPATIENT
Start: 2020-07-13 | End: 2020-08-11

## 2020-08-11 DIAGNOSIS — Z91.09 ENVIRONMENTAL ALLERGIES: ICD-10-CM

## 2020-08-11 RX ORDER — PSEUDOEPHEDRINE HCL AND TRIPOLIDINE 60; 2.5 MG/1; MG/1
TABLET, FILM COATED ORAL
Qty: 264 TAB | Refills: 0 | Status: SHIPPED | OUTPATIENT
Start: 2020-08-11 | End: 2020-09-09

## 2020-09-09 DIAGNOSIS — Z91.09 ENVIRONMENTAL ALLERGIES: ICD-10-CM

## 2020-09-09 DIAGNOSIS — E03.9 HYPOTHYROIDISM, UNSPECIFIED TYPE: ICD-10-CM

## 2020-09-09 RX ORDER — PSEUDOEPHEDRINE HCL AND TRIPOLIDINE 60; 2.5 MG/1; MG/1
TABLET, FILM COATED ORAL
Qty: 264 TAB | Refills: 1 | Status: SHIPPED | OUTPATIENT
Start: 2020-09-09 | End: 2020-11-02

## 2020-09-09 RX ORDER — LEVOTHYROXINE SODIUM 0.07 MG/1
TABLET ORAL
Qty: 30 TAB | Refills: 5 | Status: SHIPPED | OUTPATIENT
Start: 2020-09-09 | End: 2021-02-25

## 2020-11-02 DIAGNOSIS — Z91.09 ENVIRONMENTAL ALLERGIES: ICD-10-CM

## 2020-11-02 RX ORDER — PSEUDOEPHEDRINE HCL AND TRIPOLIDINE 60; 2.5 MG/1; MG/1
TABLET, FILM COATED ORAL
Qty: 120 TAB | Refills: 0 | Status: SHIPPED | OUTPATIENT
Start: 2020-11-02 | End: 2020-11-30

## 2021-01-05 ENCOUNTER — TELEPHONE (OUTPATIENT)
Dept: MEDICAL GROUP | Facility: PHYSICIAN GROUP | Age: 61
End: 2021-01-05

## 2021-01-05 NOTE — TELEPHONE ENCOUNTER
"Patients  called and demanding a refill of his wife's aprodine.  Last time it was filled Dr. Burnette gave them 1 month supply and stated that she needed an appointment for any more refills.  He called yesterday and requested another refill and it was submitted but not filled because patient needs an appointment to refill.  She was last seen on 10/31/2019 and according to her  she is in self quarantine and \"it wouldn't be good news if  [facility] is found out to be  discriminating against senior citizen that do not have access to the Internet or a computer.\" It was also request and sent in on 11/02/20 and stated that she needed an appoitnment for more refills.  "

## 2022-12-07 ENCOUNTER — TELEPHONE (OUTPATIENT)
Dept: HEALTH INFORMATION MANAGEMENT | Facility: OTHER | Age: 62
End: 2022-12-07
Payer: COMMERCIAL
